# Patient Record
Sex: FEMALE
[De-identification: names, ages, dates, MRNs, and addresses within clinical notes are randomized per-mention and may not be internally consistent; named-entity substitution may affect disease eponyms.]

---

## 2018-05-31 ENCOUNTER — HOSPITAL ENCOUNTER (EMERGENCY)
Dept: HOSPITAL 97 - ER | Age: 23
Discharge: HOME | End: 2018-05-31
Payer: MEDICAID

## 2018-05-31 DIAGNOSIS — Z32.02: Primary | ICD-10-CM

## 2018-05-31 LAB — UA COMPLETE W REFLEX CULTURE PNL UR: (no result)

## 2018-05-31 PROCEDURE — 81025 URINE PREGNANCY TEST: CPT

## 2018-05-31 PROCEDURE — 87088 URINE BACTERIA CULTURE: CPT

## 2018-05-31 PROCEDURE — 81003 URINALYSIS AUTO W/O SCOPE: CPT

## 2018-05-31 PROCEDURE — 81015 MICROSCOPIC EXAM OF URINE: CPT

## 2018-05-31 PROCEDURE — 87086 URINE CULTURE/COLONY COUNT: CPT

## 2018-05-31 PROCEDURE — 36415 COLL VENOUS BLD VENIPUNCTURE: CPT

## 2018-05-31 PROCEDURE — 99283 EMERGENCY DEPT VISIT LOW MDM: CPT

## 2018-05-31 PROCEDURE — 84702 CHORIONIC GONADOTROPIN TEST: CPT

## 2018-05-31 NOTE — ER
Nurse's Notes                                                                                     

 [facility]                                                                

Name: Mendy Ho                                                                              

Age: 23 yrs                                                                                       

Sex: Female                                                                                       

: 1995                                                                                   

MRN: [id number]                                                                                   

Arrival Date: 2018                                                                          

Time: 12:20                                                                                       

Account#: [id number]                                                                            

Bed 16                                                                                            

Private MD: None, None                                                                            

Diagnosis: Pregnancy test confirmation: Negative today                                            

                                                                                                  

Presentation:                                                                                     

                                                                                             

12:37 Presenting complaint: Patient states: Reports possible yeast infection with abdominal   aj  

      cramping. Patient also reports 3 + UPT at home and negative UPT at clinic this AM.          

      Patient would like blood test for pregnancy. "My IUD fell out 2 weeks ago.". Transition     

      of care: patient was not received from another setting of care. Onset of symptoms was       

      May 31, 2018. Care prior to arrival: None.                                                  

12:37 Method Of Arrival: Ambulatory                                                           aj  

12:37 Acuity: JACQUELINE 4                                                                           aj  

12:40 Risk Assessment: Do you want to hurt yourself or someone else? Patient reports no       jl7 

      desire to harm self or others. Initial Sepsis Screen: Does the patient meet any 2           

      criteria? No. Patient's initial sepsis screen is negative. Does the patient have a          

      suspected source of infection? No. Patient's initial sepsis screen is negative.             

                                                                                                  

Triage Assessment:                                                                                

12:39 General: Appears in no apparent distress. comfortable, Behavior is calm, cooperative,   aj  

      appropriate for age. Neuro: Level of Consciousness is awake, alert, obeys commands,         

      Oriented to person, place, time, situation, Appropriate for age. Respiratory: Airway is     

      patent Respiratory effort is even, unlabored, Respiratory pattern is regular,               

      symmetrical. GI:. : Reports cramping, vaginal itching, Denies vaginal bleeding. Derm:     

      Skin is intact, is healthy with good turgor, Skin is pink, warm \T\ dry. normal.            

                                                                                                  

OB/GYN:                                                                                           

12:39 LMP N/A - Irregular menses                                                              aj  

                                                                                                  

Historical:                                                                                       

- Allergies:                                                                                      

12:39 No Known Allergies;                                                                     aj  

- Home Meds:                                                                                      

12:39 None [Active];                                                                          aj  

- PMHx:                                                                                           

12:39 None;                                                                                   aj  

- PSHx:                                                                                           

12:39 ;                                                                              aj  

                                                                                                  

- Immunization history:: Adult Immunizations up to date.                                          

- Social history:: Smoking status: Patient/guardian denies using tobacco.                         

- Ebola Screening: : No symptoms or risks identified at this time.                                

                                                                                                  

                                                                                                  

Screenin:50 Abuse screen: Denies threats or abuse. Denies injuries from another. Nutritional        jl7 

      screening: No deficits noted. Tuberculosis screening: No symptoms or risk factors           

      identified. Fall Risk None identified.                                                      

                                                                                                  

Assessment:                                                                                       

12:50 General: Appears in no apparent distress. uncomfortable, Behavior is calm, cooperative, jl7 

      appropriate for age. Pain: Complains of pain in suprapubic area Pain does not radiate.      

      Pain currently is 7 out of 10 on a pain scale. Pain began 2-3 days ago. Is                  

      intermittent. Neuro: Level of Consciousness is awake, alert, obeys commands.                

      Cardiovascular: Patient's skin is warm and dry. Respiratory: Airway is patent               

      Respiratory effort is even, unlabored, Respiratory pattern is regular, symmetrical. GI:     

      Bowel sounds present X 4 quads. Abd is soft and non tender. : Reports vaginal             

      itching, "For a year but I haven't gotten treated." Denies burning with urination.          

      Derm: Skin is pink, warm \T\ dry.                                                           

14:00 Reassessment: Patient and/or family updated on plan of care and expected duration. Pain jl7 

      level reassessed. Patient is alert, oriented x 3, equal unlabored respirations, skin        

      warm/dry/pink. Pt awaiting lab results.                                                     

15:00 Reassessment: Patient appears in no apparent distress at this time. No changes from     jl7 

      previously documented assessment. Awaiting lab results. Raffi from lab reports results       

      should be done soon. Provider notified.                                                     

15:30 Reassessment: Raffi from lab states "Results are posted." Provider notified.             jl7 

16:06 Reassessment: Patient is alert, oriented x 3, equal unlabored respirations, skin        aa5 

      warm/dry/pink.                                                                              

                                                                                                  

Vital Signs:                                                                                      

12:39  / 77; Pulse 91; Resp 16; Temp 97.7; Pulse Ox 99% on R/A; Weight 72.57 kg; Height aj  

      5 ft. 4 in. (162.56 cm); Pain 6/10;                                                         

14:30  / 75; Pulse 85; Resp 16; Pulse Ox 99% ;                                          jl7 

16:06  / 74; Pulse 88; Resp 14 S; Pulse Ox 99% on R/A;                                  aa5 

12:39 Body Mass Index 27.46 (72.57 kg, 162.56 cm)                                               

                                                                                                  

ED Course:                                                                                        

12:20 Patient arrived in ED.                                                                  mr  

12:20 None, None is Private Physician.                                                        mr  

12:38 Triage completed.                                                                       aj  

12:39 Arm band placed on left wrist. Patient placed in an exam room.                          aj  

12:50 Patient has correct armband on for positive identification. Bed in low position. Call   jl7 

      light in reach. Side rails up X 1.                                                          

12:50 Urine collected: clean catch specimen.                                                  jl7 

12:57 Julisa Edmond RN is Primary Nurse.                                                      jl7 

13:05 Hussein Buckner MD is Attending Physician.                                              kdr 

13:49 HCG-Quantitative Sent.                                                                  jl7 

16:06 Patient did not have IV access during this emergency room visit.                        aa5 

16:06 No provider procedures requiring assistance completed.                                  jl7 

                                                                                                  

Administered Medications:                                                                         

No medications were administered                                                                  

                                                                                                  

                                                                                                  

Outcome:                                                                                          

15:44 Discharge ordered by MD.                                                                kdr 

16:06 Discharged to home ambulatory.                                                          aa5 

16:06 Condition: good                                                                             

16:06 Discharge instructions given to patient, Instructed on discharge instructions, follow       

      up and referral plans. medication usage, Demonstrated understanding of instructions,        

      follow-up care, medications, Prescriptions given X 1.                                       

16:07 Patient left the ED.                                                                    jb1 

                                                                                                  

Signatures:                                                                                       

Missael Cartagena                                 jb1                                                  

Kisha Hernandez, RN                       RN   Hussein Leos MD MD kdr Rivera, Maria                                mr KingstonLaurel almaguer, RN                     RN   Julisa Arriaga, DANIELLA                        RN   jl7                                                  

                                                                                                  

Corrections: (The following items were deleted from the chart)                                    

12:40 12:37 Presenting complaint: Patient states: Reports possible yeast infection with       aj  

      abdominal cramping. Patient also reports 3 + UPT at home and negative UPT at clinic         

      this AM. Patient would like blood test for pregnancy aj                                     

13:12 12:50 : Denies burning with urination, jl7                                            7 

16:48 16:47 Initial Sepsis Screen: Does the patient meet any 2 criteria? No. Patient's        7 

      initial sepsis screen is negative. Does the patient have a suspected source of              

      infection? No. Patient's initial sepsis screen is negative. jl7                             

16:48 16:47 Risk Assessment: Do you want to hurt yourself or someone else? Patient reports no jl7 

      desire to harm self or others. jl7                                                          

                                                                                                  

**************************************************************************************************

## 2018-05-31 NOTE — EDPHYS
Physician Documentation                                                                           

 [facility]                                                                

Name: Mendy Ho                                                                              

Age: 23 yrs                                                                                       

Sex: Female                                                                                       

: 1995                                                                                   

MRN: [id number]                                                                                   

Arrival Date: 2018                                                                          

Time: 12:20                                                                                       

Account#: [id number]                                                                            

Bed 16                                                                                            

Private MD: None, None                                                                            

ED Physician Hussein Buckner                                                                       

HPI:                                                                                              

                                                                                             

21:48 This 23 yrs old  Female presents to ER via Ambulatory with complaints of        kdr 

      Abdominal Pain.                                                                             

21:48 The patient presents to the emergency department with The patient states that she took  kdr 

      three home pregnancy tests that were positive and then went to another clinic where         

      they performed two tests that were negative. She wants to know whether she is pregnant      

      or not.                                                                                     

                                                                                                  

OB/GYN:                                                                                           

12:39 LMP N/A - Irregular menses                                                              aj  

                                                                                                  

Historical:                                                                                       

- Allergies:                                                                                      

12:39 No Known Allergies;                                                                     aj  

- Home Meds:                                                                                      

12:39 None [Active];                                                                          aj  

- PMHx:                                                                                           

12:39 None;                                                                                   aj  

- PSHx:                                                                                           

12:39 ;                                                                              aj  

                                                                                                  

- Immunization history:: Adult Immunizations up to date.                                          

- Social history:: Smoking status: Patient/guardian denies using tobacco.                         

- Ebola Screening: : No symptoms or risks identified at this time.                                

                                                                                                  

                                                                                                  

ROS:                                                                                              

21:48 Constitutional: Negative for fever, chills, and weight loss, Eyes: Negative for injury, kdr 

      pain, redness, and discharge, ENT: Negative for injury, pain, and discharge, Neck:          

      Negative for injury, pain, and swelling, Cardiovascular: Negative for chest pain,           

      palpitations, and edema, Respiratory: Negative for shortness of breath, cough,              

      wheezing, and pleuritic chest pain, Abdomen/GI: Negative for abdominal pain, nausea,        

      vomiting, diarrhea, and constipation, Back: Negative for injury and pain, MS/Extremity:     

      Negative for injury and deformity, Skin: Negative for injury, rash, and discoloration,      

      Neuro: Negative for headache, weakness, numbness, tingling, and seizure activity.           

      Psych: Negative for depression, anxiety, suicide ideation, homicidal ideation, and          

      hallucinations, Allergy/Immunology: Negative for hives, rash, and allergies, Endocrine:     

      Negative for neck swelling, polydipsia, polyuria, polyphagia, and marked weight             

      changes, Hematologic/Lymphatic: Negative for swollen nodes, abnormal bleeding, and          

      unusual bruising.                                                                           

21:48 : Positive for vaginal itching, Intermittent for a year or more - denies discharge.       

                                                                                                  

Exam:                                                                                             

21:48 Constitutional:  This is a well developed, well nourished patient who is awake, alert,  kdr 

      and in no acute distress. Head/Face:  Normocephalic, atraumatic. Eyes:  Pupils equal        

      round and reactive to light, extra-ocular motions intact.  Lids and lashes normal.          

      Conjunctiva and sclera are non-icteric and not injected.  Cornea within normal limits.      

      Periorbital areas with no swelling, redness, or edema. Neck:  Trachea midline, no           

      thyromegaly or masses palpated, and no cervical lymphadenopathy.  Supple, full range of     

      motion without nuchal rigidity, or vertebral point tenderness.  No Meningismus.             

      Chest/axilla:  Normal chest wall appearance and motion.  Nontender with no deformity.       

      No lesions are appreciated. Cardiovascular:  Regular rate and rhythm with a normal S1       

      and S2.  No gallops, murmurs, or rubs.  Normal PMI, no JVD.  No pulse deficits.             

      Respiratory:  Lungs have equal breath sounds bilaterally, clear to auscultation and         

      percussion.  No rales, rhonchi or wheezes noted.  No increased work of breathing, no        

      retractions or nasal flaring. Abdomen/GI:  Soft, non-tender, with normal bowel sounds.      

      No distension or tympany.  No guarding or rebound.  No evidence of tenderness               

      throughout. Back:  No spinal tenderness.  No costovertebral tenderness.  Full range of      

      motion. Skin:  Warm, dry with normal turgor.  Normal color with no rashes, no lesions,      

      and no evidence of cellulitis. MS/ Extremity:  Pulses equal, no cyanosis.                   

      Neurovascular intact.  Full, normal range of motion. Neuro:  Awake and alert, GCS 15,       

      oriented to person, place, time, and situation.  Cranial nerves II-XII grossly intact.      

      Motor strength 5/5 in all extremities.  Sensory grossly intact.  Cerebellar exam            

      normal.  Normal gait. Psych:  Awake, alert, with orientation to person, place and time.     

       Behavior, mood, and affect are within normal limits.                                       

                                                                                                  

Vital Signs:                                                                                      

12:39  / 77; Pulse 91; Resp 16; Temp 97.7; Pulse Ox 99% on R/A; Weight 72.57 kg; Height aj  

      5 ft. 4 in. (162.56 cm); Pain 6/10;                                                         

14:30  / 75; Pulse 85; Resp 16; Pulse Ox 99% ;                                          jl7 

16:06  / 74; Pulse 88; Resp 14 S; Pulse Ox 99% on R/A;                                  aa5 

12:39 Body Mass Index 27.46 (72.57 kg, 162.56 cm)                                             aj  

                                                                                                  

MDM:                                                                                              

13:45 Patient medically screened.                                                             snw 

21:48 Data reviewed: vital signs, lab test result(s). Counseling: I had a detailed discussion kdr 

      with the patient and/or guardian regarding: the historical points, exam findings, and       

      any diagnostic results supporting the discharge/admit diagnosis, lab results, the need      

      for outpatient follow up.                                                                   

                                                                                                  

                                                                                             

13:10 Order name: Urine Dipstick--Ancillary (enter results); Complete Time: 13:41               

                                                                                             

13:10 Order name: Urine Pregnancy--Ancillary (enter results); Complete Time: 13:41              

                                                                                             

13:10 Order name: Urine Microscopic Only; Complete Time: 13:41                                  

                                                                                             

13:10 Order name: Urine Culture                                                                 

                                                                                             

13:31 Order name: HCG-Quantitative                                                            [facility] 

                                                                                             

13:31 Order name: HCG, Quantitative; Complete Time: 15:39                                     EDMS

                                                                                                  

Administered Medications:                                                                         

No medications were administered                                                                  

                                                                                                  

                                                                                                  

Disposition:                                                                                      

18 15:44 Discharged to Home. Impression: Pregnancy test confirmation: Negative today.       

- Condition is Fair.                                                                              

                                                                                                  

- Prescriptions for Fluconazole 150 mg Oral Tablet - take 1 tablet by ORAL route once             

  daily; 1 tablet.                                                                                

- Medication Reconciliation Form, Thank You Letter form.                                          

- Follow up: Private Physician; When: 2 - 3 days; Reason: If symptoms return, Further             

  diagnostic work-up, Recheck today's complaints, Continuance of care, Re-evaluation by           

  your physician.                                                                                 

- Problem is new.                                                                                 

- Symptoms are resolved.                                                                          

                                                                                                  

                                                                                                  

                                                                                                  

Signatures:                                                                                       

Dispatcher [employer]                           EDMS                                                 

Missael Cartagena                                 jb1                                                  

Kisha Hernandez, DANIELLA                       RN   Hussein Leos MD MD kdr Therrien, Shelly, FNP-C                 FNP-Csnw                                                  

Julisa Edmond RN                        RN   jl7                                                  

                                                                                                  

Corrections: (The following items were deleted from the chart)                                    

16:07 15:44 2018 15:44 Discharged to Home. Impression: Pregnancy test confirmation:     jb1 

      Negative today. Condition is Fair. Forms are Medication Reconciliation Form, Thank You      

      Letter, Antibiotic Education, Prescription Opioid Use. Follow up: Private Physician;        

      When: 2 - 3 days; Reason: If symptoms return, Further diagnostic work-up, Recheck           

      today's complaints, Continuance of care, Re-evaluation by your physician. Problem is        

      new. Symptoms are resolved. kdr                                                             

                                                                                                  

**************************************************************************************************

## 2019-02-13 ENCOUNTER — HOSPITAL ENCOUNTER (EMERGENCY)
Dept: HOSPITAL 97 - ER | Age: 24
LOS: 1 days | Discharge: HOME | End: 2019-02-14
Payer: COMMERCIAL

## 2019-02-13 DIAGNOSIS — Z3A.08: ICD-10-CM

## 2019-02-13 DIAGNOSIS — O26.891: Primary | ICD-10-CM

## 2019-02-13 LAB
BUN BLD-MCNC: 8 MG/DL (ref 7–18)
GLUCOSE SERPLBLD-MCNC: 92 MG/DL (ref 74–106)
HCG SERPL-ACNC: (no result) MIU/ML (ref 1–3)
HCT VFR BLD CALC: 40.6 % (ref 36–45)
LYMPHOCYTES # SPEC AUTO: 2.6 K/UL (ref 0.7–4.9)
PMV BLD: 10.1 FL (ref 7.6–11.3)
POTASSIUM SERPL-SCNC: 3.7 MMOL/L (ref 3.5–5.1)
RBC # BLD: 4.72 M/UL (ref 3.86–4.86)

## 2019-02-13 PROCEDURE — 81003 URINALYSIS AUTO W/O SCOPE: CPT

## 2019-02-13 PROCEDURE — 86900 BLOOD TYPING SEROLOGIC ABO: CPT

## 2019-02-13 PROCEDURE — 87590 N.GONORRHOEAE DNA DIR PROB: CPT

## 2019-02-13 PROCEDURE — 87490 CHLMYD TRACH DNA DIR PROBE: CPT

## 2019-02-13 PROCEDURE — 84702 CHORIONIC GONADOTROPIN TEST: CPT

## 2019-02-13 PROCEDURE — 99284 EMERGENCY DEPT VISIT MOD MDM: CPT

## 2019-02-13 PROCEDURE — 80048 BASIC METABOLIC PNL TOTAL CA: CPT

## 2019-02-13 PROCEDURE — 86901 BLOOD TYPING SEROLOGIC RH(D): CPT

## 2019-02-13 PROCEDURE — 81015 MICROSCOPIC EXAM OF URINE: CPT

## 2019-02-13 PROCEDURE — 36415 COLL VENOUS BLD VENIPUNCTURE: CPT

## 2019-02-13 PROCEDURE — 76817 TRANSVAGINAL US OBSTETRIC: CPT

## 2019-02-13 PROCEDURE — 87210 SMEAR WET MOUNT SALINE/INK: CPT

## 2019-02-13 PROCEDURE — 85025 COMPLETE CBC W/AUTO DIFF WBC: CPT

## 2019-02-13 PROCEDURE — 81025 URINE PREGNANCY TEST: CPT

## 2019-02-14 LAB — UA COMPLETE W REFLEX CULTURE PNL UR: (no result)

## 2019-02-14 NOTE — ER
Nurse's Notes                                                                                     

 [facility]                                                                

Name: Mendy Ho                                                                              

Age: 24 yrs                                                                                       

Sex: Female                                                                                       

: 1995                                                                                   

MRN: [id number]                                                                                   

Arrival Date: 2019                                                                          

Time: 20:00                                                                                       

Account#: [id number]                                                                            

Bed 23                                                                                            

Private MD:                                                                                       

Diagnosis: First trimester pregnancy - vaginal discharge                                          

                                                                                                  

Presentation:                                                                                     

                                                                                             

20:24 Presenting complaint: Patient states: "I've been having lower pain and I'm 8 weeks      aj1 

      pregnant. I haven't gone to the doctor and I have green discharge, and I've been            

      getting really bad headaches." Patient denies fever. Transition of care: patient was        

      not received from another setting of care. Onset of symptoms was 2019.         

      Risk Assessment: Do you want to hurt yourself or someone else? Patient reports no           

      desire to harm self or others. Initial Sepsis Screen: Does the patient meet any 2           

      criteria? No. Patient's initial sepsis screen is negative. Does the patient have a          

      suspected source of infection? No. Patient's initial sepsis screen is negative. Care        

      prior to arrival: None.                                                                     

20:24 Method Of Arrival: Ambulatory                                                           aj1 

20:24 Acuity: JACQUELINE 3                                                                           aj1 

                                                                                                  

Triage Assessment:                                                                                

20:26 General: Appears in no apparent distress. comfortable, Behavior is calm, cooperative,   aj1 

      appropriate for age. Pain: Complains of pain in pelvis Pain currently is 6 out of 10 on     

      a pain scale. Neuro: Level of Consciousness is awake, alert, obeys commands.                

      Cardiovascular: Patient's skin is warm and dry. Respiratory: Airway is patent               

      Respiratory effort is even, unlabored, Respiratory pattern is regular, symmetrical. GI:     

      No signs and/or symptoms were reported involving the gastrointestinal system. :           

      Parent/caregiver report the patient having discharge from vagina that is green.             

                                                                                                  

OB/GYN:                                                                                           

20:26 LMP 2018                                                                          aj1 

21:50  2, Full Term 1, Living 1                                                        pm1 

                                                                                                  

Historical:                                                                                       

- Home Meds:                                                                                      

20:26 Prenatal Vitamin Oral tab 1 tab once daily [Active];                                    aj1 

- PMHx:                                                                                           

20:26 None;                                                                                   aj1 

- PSHx:                                                                                           

20:26 ;                                                                              aj1 

                                                                                                  

- Immunization history:: Flu vaccine is not up to date.                                           

- Social history:: Smoking status: Patient/guardian denies using tobacco.                         

- Ebola Screening: : Patient denies travel to an Ebola-affected area in the  days               

  before illness onset.                                                                           

                                                                                                  

                                                                                                  

Screenin/14                                                                                             

01:59 Abuse screen: Denies threats or abuse. Denies injuries from another. Nutritional        mg2 

      screening: No deficits noted. Tuberculosis screening: No symptoms or risk factors           

      identified. Fall Risk IV access (20 points).                                                

                                                                                                  

Assessment:                                                                                       

                                                                                             

20:45 General: Appears in no apparent distress. comfortable, Behavior is calm, cooperative,   ca1 

      appropriate for age. Pain: Complains of pain in suprapubic area and pelvis Pain             

      currently is 7 out of 10 on a pain scale. Neuro: Level of Consciousness is awake,           

      alert, obeys commands, Oriented to person, place, time, situation. Cardiovascular:          

      Heart tones S1 S2 present Capillary refill < 3 seconds Patient's skin is warm and dry.      

      Respiratory: Airway is patent Respiratory effort is even, unlabored, Respiratory            

      pattern is regular, symmetrical. GI: Abdomen is flat, non-distended, Bowel sounds           

      present X 4 quads. Abd is soft X 4 quads Abdomen is tender to palpation in suprapubic       

      area and pelvis. : Reports discharge, green. EENT: No signs and/or symptoms were          

      reported regarding the EENT system. Derm: Skin is intact, is healthy with good turgor,      

      Skin is pink, warm \T\ dry. Musculoskeletal: Circulation, motion, and sensation intact.     

21:50 Reassessment: Patient appears in no apparent distress at this time. Patient and/or      ca1 

      family updated on plan of care and expected duration. Pain level reassessed. Patient is     

      alert, oriented x 3, equal unlabored respirations, skin warm/dry/pink.                      

22:49 Reassessment: Patient appears in no apparent distress at this time. Patient and/or      ca1 

      family updated on plan of care and expected duration. Pain level reassessed. Patient is     

      alert, oriented x 3, equal unlabored respirations, skin warm/dry/pink.                      

                                                                                             

00:00 Reassessment: Patient appears in no apparent distress at this time. Patient and/or      ca1 

      family updated on plan of care and expected duration. Pain level reassessed. Patient is     

      alert, oriented x 3, equal unlabored respirations, skin warm/dry/pink. Provider at          

      bedside for pelvic exam.                                                                    

                                                                                                  

Vital Signs:                                                                                      

                                                                                             

20:26  / 70; Pulse 89; Resp 18; Temp 97.2; Pulse Ox 100% on R/A; Weight 81.19 kg (R);   aj1 

      Height 5 ft. 4 in. (162.56 cm) (R); Pain 6/10;                                              

21:30  / 72; Pulse 91; Resp 18; Pulse Ox 100% on R/A;                                   ca1 

22:35  / 75; Pulse 91; Resp 19; Pulse Ox 100% on R/A;                                   ca1 

23:40  / 71; Pulse 90; Resp 19; Pulse Ox 100% on R/A;                                   ca1 

02                                                                                             

00:34  / 64; Pulse 89; Resp 19; Pulse Ox 100% on R/A;                                   ca1 

02:33  / 62; Pulse 73; Resp 16; Temp 98.9; Pulse Ox 100% ;                              lt1 

                                                                                             

20:26 Body Mass Index 30.72 (81.19 kg, 162.56 cm)                                             aj1 

                                                                                                  

ED Course:                                                                                        

                                                                                             

20:00 Patient arrived in ED.                                                                  ag3 

20:25 Triage completed.                                                                       aj1 

20:26 Arm band placed on Patient placed in waiting room.                                      aj1 

20:40 Emmett Horvath NP is PHCP.                                                           pm1 

20:40 Dick Barnett MD is Attending Physician.                                             pm1 

21:22 Mary Anne Orona, RN is Primary Nurse.                                                      ca1 

22:11 Missed attempt(s): 22 gauge in right antecubital area.                                  lt1 

22:12 Missed attempt(s): 22 gauge in left forearm.                                            lt1 

22:29 US Transvaginal Ob In Process Unspecified.                                              EDMS

22:30 Missed attempt(s): 22 gauge in left hand.                                               ca1 

22:44 Missed attempt(s): 22 gauge in right wrist.                                             ca1 

22:50 Inserted saline lock: 20 gauge in left antecubital area, using aseptic technique.       ca1 

      ,using aseptic technique. By Ger Brown RN Blood collected.                           

                                                                                             

00:00 Assist provider with pelvic exam: Set up pelvic tray. Performed by Emmett Horvath NP   ca1 

      Specimens sent to lab. Patient tolerated well.                                              

00:53 IV discontinued, intact, bleeding controlled, No redness/swelling at site. Pressure     ca1 

      dressing applied.                                                                           

01:59 Patient has correct armband on for positive identification.                             mg2 

                                                                                                  

Administered Medications:                                                                         

No medications were administered                                                                  

                                                                                                  

                                                                                                  

Outcome:                                                                                          

02:21 Discharge ordered by MD.                                                                pm1 

02:43 Discharged to home ambulatory.                                                          rv  

02:43 Condition: good                                                                             

02:43 Discharge instructions given to patient, Instructed on discharge instructions, follow       

      up and referral plans. Demonstrated understanding of instructions, follow-up care.          

02:44 Patient left the ED.                                                                    rv  

                                                                                                  

Signatures:                                                                                       

Dispatcher [employer]                           EDDelphine Pinzon RN                     RN   aj1                                                  

Emmett Horvath, NP                    NP   pm1                                                  

Dipesh Anna RN                    RN   mg2                                                  

Ger Brown RN                    RN   rv                                                   

Kiera Arechiga                                 ag3                                                  

Mary Anne Orona RN                        RN   ca1                                                  

Ashley Haji                                   1                                                  

                                                                                                  

**************************************************************************************************

## 2019-02-14 NOTE — EDPHYS
Physician Documentation                                                                           

 [facility]                                                                

Name: Mendy Ho                                                                              

Age: 24 yrs                                                                                       

Sex: Female                                                                                       

: 1995                                                                                   

MRN: [id number]                                                                                   

Arrival Date: 2019                                                                          

Time: 20:00                                                                                       

Account#: [id number]                                                                            

Bed 23                                                                                            

Private MD:                                                                                       

ED Physician Dick Barnett                                                                      

HPI:                                                                                              

                                                                                             

21:50 This 24 yrs old  Female presents to ER via Ambulatory with complaints of        pm1 

      Vaginal Discharge, Abdominal Pain.                                                          

21:50 The patient presents with vaginal discharge, that is green discharge, patient has had   pm1 

      similar discharge in the past. Onset: The symptoms/episode began/occurred 3 week(s)         

      ago. Modifying factors: The symptoms are alleviated by nothing, the symptoms are            

      aggravated by nothing. Associated signs and symptoms: Pertinent positives: vaginal          

      discharge, lower abdominal pain, Pertinent negatives: dysuria, fever, urinary               

      frequency, vaginal bleeding. Severity of symptoms: in the emergency department the          

      symptoms are unchanged. The patient is sexually active, reportedly has a single             

      partner, does not use protection during intercourse, continuing to have sexual              

      intercourse. intercourse is not painful. The patient's method of birth control includes     

      nothing. The patient has been recently seen by a physician: Seen at another ER for the      

      same complaints 3 weeks ago.                                                                

                                                                                                  

OB/GYN:                                                                                           

20:26 LMP 2018                                                                          aj1 

21:50  2, Full Term 1, Living 1                                                        pm1 

                                                                                                  

Historical:                                                                                       

- Home Meds:                                                                                      

20:26 Prenatal Vitamin Oral tab 1 tab once daily [Active];                                    aj1 

- PMHx:                                                                                           

20:26 None;                                                                                   aj1 

- PSHx:                                                                                           

20:26 ;                                                                              aj1 

                                                                                                  

- Immunization history:: Flu vaccine is not up to date.                                           

- Social history:: Smoking status: Patient/guardian denies using tobacco.                         

- Ebola Screening: : Patient denies travel to an Ebola-affected area in the 21 days               

  before illness onset.                                                                           

                                                                                                  

                                                                                                  

ROS:                                                                                              

21:50  Positive for vaginal discharge, Negative for urinary symptoms, burning with          pm1 

      urination, vaginal bleeding.                                                                

21:50 Constitutional: Negative for fever, chills, and weight loss, Eyes: Negative for injury,     

      pain, redness, and discharge, ENT: Negative for injury, pain, and discharge, Neck:          

      Negative for injury, pain, and swelling, Cardiovascular: Negative for chest pain,           

      palpitations, and edema, Respiratory: Negative for shortness of breath, cough,              

      wheezing, and pleuritic chest pain.                                                         

21:50 Back: Negative for injury and pain, MS/Extremity: Negative for injury and deformity,        

      Skin: Negative for injury, rash, and discoloration, Neuro: Negative for headache,           

      weakness, numbness, tingling, and seizure.                                                  

21:50 Abdomen/GI: Positive for abdominal pain, of the suprapubic area.                            

                                                                                                  

Exam:                                                                                             

21:50 Constitutional:  This is a well developed, well nourished patient who is awake, alert,  pm1 

      and in no acute distress. Head/Face:  Normocephalic, atraumatic. Eyes:  Pupils equal        

      round and reactive to light, extra-ocular motions intact.  Lids and lashes normal.          

      Conjunctiva and sclera are non-icteric and not injected.  Cornea within normal limits.      

      Periorbital areas with no swelling, redness, or edema. ENT:  Nares patent. No nasal         

      discharge, no septal abnormalities noted.  Tympanic membranes are normal and external       

      auditory canals are clear.  Oropharynx with no redness, swelling, or masses, exudates,      

      or evidence of obstruction, uvula midline.  Mucous membranes moist. Neck:  Trachea          

      midline, no thyromegaly or masses palpated, and no cervical lymphadenopathy.  Supple,       

      full range of motion without nuchal rigidity, or vertebral point tenderness.  No            

      Meningismus. Chest/axilla:  Normal chest wall appearance and motion.  Nontender with no     

      deformity.  No lesions are appreciated. Cardiovascular:  Regular rate and rhythm with a     

      normal S1 and S2.  No gallops, murmurs, or rubs.  Normal PMI, no JVD.  No pulse             

      deficits. Respiratory:  Lungs have equal breath sounds bilaterally, clear to                

      auscultation and percussion.  No rales, rhonchi or wheezes noted.  No increased work of     

      breathing, no retractions or nasal flaring. Abdomen/GI:  Soft, non-tender, with normal      

      bowel sounds.  No distension or tympany.  No guarding or rebound.  No evidence of           

      tenderness throughout. Back:  No spinal tenderness.  No costovertebral tenderness.          

      Full range of motion. Skin:  Warm, dry with normal turgor.  Normal color with no            

      rashes, no lesions, and no evidence of cellulitis. MS/ Extremity:  Pulses equal, no         

      cyanosis.  Neurovascular intact.  Full, normal range of motion.                             

21:50 Neuro: Orientation: is normal, Motor: is normal, Sensation: is normal, no obvious gross     

      deficits, Gait: is steady, at a normal pace, without difficulty.                            

                                                                                                  

Vital Signs:                                                                                      

20:26  / 70; Pulse 89; Resp 18; Temp 97.2; Pulse Ox 100% on R/A; Weight 81.19 kg (R);   aj1 

      Height 5 ft. 4 in. (162.56 cm) (R); Pain 6/10;                                              

21:30  / 72; Pulse 91; Resp 18; Pulse Ox 100% on R/A;                                   ca1 

22:35  / 75; Pulse 91; Resp 19; Pulse Ox 100% on R/A;                                   ca1 

23:40  / 71; Pulse 90; Resp 19; Pulse Ox 100% on R/A;                                   ca1 

                                                                                             

00:34  / 64; Pulse 89; Resp 19; Pulse Ox 100% on R/A;                                   ca1 

02:33  / 62; Pulse 73; Resp 16; Temp 98.9; Pulse Ox 100% ;                              lt1 

                                                                                             

20:26 Body Mass Index 30.72 (81.19 kg, 162.56 cm)                                             aj1 

                                                                                                  

MDM:                                                                                              

                                                                                             

20:53 Patient medically screened.                                                             pm1 

21:53 Data reviewed: vital signs. Data interpreted: Pulse oximetry: on room air is 100 %.     pm1 

      Interpretation: normal.                                                                     

                                                                                             

02:15 Counseling: I had a detailed discussion with the patient and/or guardian regarding: the pm1 

      historical points, exam findings, and any diagnostic results supporting the                 

      discharge/admit diagnosis, lab results, radiology results, the need for outpatient          

      follow up, to return to the emergency department if symptoms worsen or persist or if        

      there are any questions or concerns that arise at home.                                     

02:21 ED course: Patient tested 3 weeks ago for gonorrhea and chlamydia at ER. Patient        pm1 

      reports results were negative. Patient with one sexual partner. Will wait for GC probe      

      results. Impression is normal vaginal discharge with first trimester pregnancy.             

                                                                                                  

                                                                                             

20:55 Order name: Urine Microscopic Only; Complete Time: 01:21                                pm1 

                                                                                             

20:56 Order name: Quantitative Hcg; Complete Time: 23:48                                      pm1 

                                                                                             

20:56 Order name: Abo/rh Typing; Complete Time: 00:03                                         pm1 

                                                                                             

20:56 Order name: Basic Metabolic Panel; Complete Time: 23:48                                 pm1 

                                                                                             

20:56 Order name: CBC with Diff; Complete Time: 23:48                                         pm1 

                                                                                             

20:56 Order name: GC (GONORR/CHLAMYDIA) Probe                                                 pm1 

                                                                                             

20:55 Order name: Urine Dipstick-Ancillary (obtain specimen); Complete Time: 23:16            pm1 

                                                                                             

20:55 Order name: Urine Pregnancy Test (obtain specimen); Complete Time: 23:16                pm1 

                                                                                             

20:56 Order name: IV Saline Lock; Complete Time: 22:58                                        pm1 

                                                                                             

20:56 Order name: Labs collected and sent; Complete Time: 22:58                               pm1 

                                                                                             

21:13 Order name: US Transvaginal Ob                                                          pm1 

                                                                                             

23:07 Order name: Urine Dipstick--Ancillary (enter results); Complete Time: 23:48             ar5 

                                                                                             

23:07 Order name: Urine Pregnancy--Ancillary (enter results); Complete Time: 23:48            ar5 

                                                                                             

23:27 Order name: Wet Prep; Complete Time: 02:14                                              pm1 

                                                                                             

20:56 Order name: NPO; Complete Time: 22:58                                                   pm1 

                                                                                             

20:56 Order name: Pelvic Exam Setup; Complete Time: 00:20                                     pm1 

                                                                                                  

Administered Medications:                                                                         

No medications were administered                                                                  

                                                                                                  

                                                                                                  

Disposition:                                                                                      

07:27 Co-signature as Attending Physician, Dick Barnett MD I agree with the assessment and  keyana 

      plan of care.                                                                               

                                                                                                  

Disposition:                                                                                      

19 02:21 Discharged to Home. Impression: First trimester pregnancy - vaginal discharge.     

- Condition is Stable.                                                                            

- Discharge Instructions: First Trimester of Pregnancy.                                           

                                                                                                  

- Medication Reconciliation Form, Thank You Letter, Antibiotic Education, Prescription            

  Opioid Use, Family Work Release form.                                                           

- Follow up: Emergency Department; When: As needed; Reason: Worsening of condition.               

  Follow up: Private Physician; When: 2 - 3 days; Reason: Recheck today's complaints,             

  Continuance of care, Re-evaluation by your physician.                                           

- Problem is new.                                                                                 

- Symptoms have improved.                                                                         

                                                                                                  

                                                                                                  

                                                                                                  

Signatures:                                                                                       

Dispatcher [employer]                           Delphine Traylor, RN                     RN   aj1                                                  

Dick Barnett MD MD cha Marinas, Patrick, NP                    NP   pm1                                                  

Ger Brown RN                    RN   rv                                                   

                                                                                                  

Corrections: (The following items were deleted from the chart)                                    

02:44 02:21 2019 02:21 Discharged to Home. Impression: First trimester pregnancy -      rv  

      vaginal discharge. Condition is Stable. Forms are Medication Reconciliation Form, Thank     

      You Letter, Antibiotic Education, Prescription Opioid Use. Follow up: Emergency             

      Department; When: As needed; Reason: Worsening of condition. Follow up: Private             

      Physician; When: 2 - 3 days; Reason: Recheck today's complaints, Continuance of care,       

      Re-evaluation by your physician. Problem is new. Symptoms have improved. pm1                

                                                                                                  

**************************************************************************************************

## 2019-02-14 NOTE — RAD REPORT
EXAM DESCRIPTION:  US - Transvaginal OB - 2/13/2019 10:28 pm

 

CLINICAL HISTORY:  Pregnancy, abdominal cramping, vaginal discharge

 

Preliminary findings provided at the time of the study.

 

COMPARISON:  Ultrasound December 2016

 

TECHNIQUE:  Endovaginal sonography performed.

 

FINDINGS:  The normal shaped intrauterine gestational sac is identified. Yolk sac and fetal pole iden
tified. Heart rate is 158 BPM. A 10 millimeter subchorionic hemorrhage is present at the inferior mar
gin of the gestational sac not regarded as significant at that small size. Crown-rump length correspo
nds to 8 week 0 day age. Calculated LINK is 09/25/2019. Cervical canal is closed.

 

No ovarian or adnexal abnormality seen. No blood or fluid in the cul de sac. No myometrial mass.

 

IMPRESSION:  Single 8 week 0 day IUP identified with heart rate 158 BPM.

 

Small 10 mm subchorionic hemorrhage along the inferior margin common not regarded as significant at t
hat size.

## 2019-03-10 ENCOUNTER — HOSPITAL ENCOUNTER (EMERGENCY)
Dept: HOSPITAL 97 - ER | Age: 24
LOS: 1 days | Discharge: HOME | End: 2019-03-11
Payer: COMMERCIAL

## 2019-03-10 DIAGNOSIS — Z3A.11: ICD-10-CM

## 2019-03-10 DIAGNOSIS — O26.891: Primary | ICD-10-CM

## 2019-03-10 DIAGNOSIS — B34.9: ICD-10-CM

## 2019-03-10 DIAGNOSIS — R42: ICD-10-CM

## 2019-03-10 DIAGNOSIS — R05: ICD-10-CM

## 2019-03-10 DIAGNOSIS — R07.9: ICD-10-CM

## 2019-03-10 LAB
ALBUMIN SERPL BCP-MCNC: 3.7 G/DL (ref 3.4–5)
ALP SERPL-CCNC: 70 U/L (ref 45–117)
ALT SERPL W P-5'-P-CCNC: 16 U/L (ref 12–78)
AST SERPL W P-5'-P-CCNC: 13 U/L (ref 15–37)
BLD SMEAR INTERP: (no result)
BUN BLD-MCNC: 6 MG/DL (ref 7–18)
GLUCOSE SERPLBLD-MCNC: 94 MG/DL (ref 74–106)
HCT VFR BLD CALC: 39 % (ref 36–45)
INR BLD: 1.05
LYMPHOCYTES # SPEC AUTO: 0.5 K/UL (ref 0.7–4.9)
MAGNESIUM SERPL-MCNC: 1.9 MG/DL (ref 1.8–2.4)
MORPHOLOGY BLD-IMP: (no result)
NT-PROBNP SERPL-MCNC: 19 PG/ML (ref ?–125)
PMV BLD: 10.2 FL (ref 7.6–11.3)
POTASSIUM SERPL-SCNC: 3.3 MMOL/L (ref 3.5–5.1)
RBC # BLD: 4.56 M/UL (ref 3.86–4.86)
TROPONIN (EMERG DEPT USE ONLY): < 0.02 NG/ML (ref 0–0.04)
UA COMPLETE W REFLEX CULTURE PNL UR: (no result)

## 2019-03-10 PROCEDURE — 96360 HYDRATION IV INFUSION INIT: CPT

## 2019-03-10 PROCEDURE — 83880 ASSAY OF NATRIURETIC PEPTIDE: CPT

## 2019-03-10 PROCEDURE — 84484 ASSAY OF TROPONIN QUANT: CPT

## 2019-03-10 PROCEDURE — 93005 ELECTROCARDIOGRAM TRACING: CPT

## 2019-03-10 PROCEDURE — 85610 PROTHROMBIN TIME: CPT

## 2019-03-10 PROCEDURE — 85025 COMPLETE CBC W/AUTO DIFF WBC: CPT

## 2019-03-10 PROCEDURE — 87081 CULTURE SCREEN ONLY: CPT

## 2019-03-10 PROCEDURE — 36415 COLL VENOUS BLD VENIPUNCTURE: CPT

## 2019-03-10 PROCEDURE — 80048 BASIC METABOLIC PNL TOTAL CA: CPT

## 2019-03-10 PROCEDURE — 81003 URINALYSIS AUTO W/O SCOPE: CPT

## 2019-03-10 PROCEDURE — 81015 MICROSCOPIC EXAM OF URINE: CPT

## 2019-03-10 PROCEDURE — 87070 CULTURE OTHR SPECIMN AEROBIC: CPT

## 2019-03-10 PROCEDURE — 81025 URINE PREGNANCY TEST: CPT

## 2019-03-10 PROCEDURE — 87804 INFLUENZA ASSAY W/OPTIC: CPT

## 2019-03-10 PROCEDURE — 83735 ASSAY OF MAGNESIUM: CPT

## 2019-03-10 PROCEDURE — 80076 HEPATIC FUNCTION PANEL: CPT

## 2019-03-10 PROCEDURE — 99284 EMERGENCY DEPT VISIT MOD MDM: CPT

## 2019-03-10 NOTE — XMS REPORT
Patient Summary Document

 Created on:March 10, 2019



Patient:YULIANA ASHRAF

Sex:Female

:1995

External Reference #:[id number]





Demographics







 Address  [address] [place] [address], [place] [address]

 

 Home Phone  [phone]

 

 Preferred Language  Unknown

 

 Marital Status  Unknown

 

 Anabaptist Affiliation  Unknown

 

 Race  Unknown

 

 Additional Race(s)  Unavailable

 

 Ethnic Group  Unknown









Author







 Organization  [facility]

 

 Address  [facility]



   [place] [address]

 

 Phone  [phone]









Care Team Providers







 Name  Role  Phone

 

 Unavailable  Unavailable  Unavailable









Problems

This patient has no known problems.



Allergies, Adverse Reactions, Alerts

This patient has no known allergies or adverse reactions.



Medications

This patient has no known medications.

## 2019-03-11 NOTE — ER
Nurse's Notes                                                                                     

 [facility]                                                                

Name: Mendy Ho                                                                              

Age: 24 yrs                                                                                       

Sex: Female                                                                                       

: 1995                                                                                   

MRN: [id number]                                                                                   

Arrival Date: 03/10/2019                                                                          

Time: 21:27                                                                                       

Account#: [id number]                                                                            

Bed 26                                                                                            

Private MD:                                                                                       

Diagnosis: Viral infection, unspecified                                                           

                                                                                                  

Presentation:                                                                                     

03/10                                                                                             

21:48 Presenting complaint: Patient states: SINCE LAST NIGHT PT C/O COUGH, ITCHING THROAT,    ak1 

      DIZZY, CHEST PAIN. PT OB/GYN [facility]. Transition of care: patient was not received from        

      another setting of care. Onset of symptoms was 2019. Risk Assessment: Do you      

      want to hurt yourself or someone else? Patient reports no desire to harm self or            

      others. Care prior to arrival: None.                                                        

21:48 Method Of Arrival: Ambulatory                                                           ak1 

21:48 Acuity: JACQUELINE 3                                                                           ak1 

22:20 Initial Sepsis Screen: Does the patient meet any 2 criteria? No. Patient's initial      rv  

      sepsis screen is negative. Does the patient have a suspected source of infection? No.       

      Patient's initial sepsis screen is negative.                                                

                                                                                                  

Triage Assessment:                                                                                

21:49 General: Appears in no apparent distress. Behavior is calm, cooperative.                ak1 

                                                                                                  

OB/GYN:                                                                                           

21:49 LMP 2018, Pregnancy Verified, EDC 2019, Gestational age from LMP: 11 weeks 2 ak1 

      days                                                                                        

                                                                                                  

Historical:                                                                                       

- Allergies:                                                                                      

21:49 No Known Allergies;                                                                     ak1 

- Home Meds:                                                                                      

21:49 Prenatal Vitamin Oral tab 1 tab once daily [Active];                                    ak1 

- PMHx:                                                                                           

21:49 None;                                                                                   ak1 

- PSHx:                                                                                           

21:49 ;                                                                              ak1 

                                                                                                  

- Immunization history:: Adult Immunizations unknown.                                             

- Social history:: Smoking status: Patient/guardian denies using tobacco.                         

- Ebola Screening: : No symptoms or risks identified at this time.                                

                                                                                                  

                                                                                                  

Screenin:20 Abuse screen: Denies threats or abuse. Denies injuries from another. Nutritional        rv  

      screening: No deficits noted. Tuberculosis screening: No symptoms or risk factors           

      identified. Fall Risk None identified.                                                      

                                                                                                  

Assessment:                                                                                       

22:18 General: Appears in no apparent distress. comfortable, Behavior is calm, cooperative.   rv  

      Pain: Complains of pain in chest. Neuro: Level of Consciousness is awake, alert, obeys      

      commands, Oriented to person, place, time, situation. Cardiovascular: Capillary refill      

      < 3 seconds. Respiratory: Airway is patent. GI: Abdomen is flat. : No signs and/or        

      symptoms were reported regarding the genitourinary system. EENT: No signs and/or            

      symptoms were reported regarding the EENT system. Derm: Skin is intact.                     

      Musculoskeletal: No signs and/or symptoms reported regarding the musculoskeletal system.    

                                                                                             

00:25 Reassessment: Patient states feeling better.                                            mg2 

                                                                                                  

Vital Signs:                                                                                      

03/10                                                                                             

21:49  / 64; Pulse 129; Resp 16; Temp 98.5; Pulse Ox 100% on R/A; Weight 83.91 kg (R);  ak1 

      Height 5 ft. 4 in. (162.56 cm) (R); Pain 10/10;                                             

22:17 Temp 101.8(O);                                                                          mg2 

23:05  / 67; Pulse 112; Resp 17; Temp 99.7(O); Pulse Ox 100% 0 lpm ; Pain 2/10;         mg2 

21:49 Body Mass Index 31.75 (83.91 kg, 162.56 cm)                                             ak1 

                                                                                                  

ED Course:                                                                                        

21:27 Patient arrived in ED.                                                                  es  

21:49 Triage completed.                                                                       ak1 

21:49 Arm band placed on Patient placed in waiting room, Patient notified of wait time.       ak1 

21:54 Nasir Gonzalez PA is PHCP.                                                               [id number] 

21:54 Go Anderson MD is Attending Physician.                                            [id number] 

22:10 Inserted saline lock: 18 gauge in right antecubital area, using aseptic technique.      rv  

      Blood collected.                                                                            

22:18 Strep Sent.                                                                             rv  

22:18 Flu Sent.                                                                               rv  

22:20 Patient has correct armband on for positive identification. Placed in gown. Bed in low  rv  

      position. Call light in reach. Side rails up X 1. Adult w/ patient. Cardiac monitor on.     

      Pulse ox on. NIBP on.                                                                       

                                                                                             

00:30 No provider procedures requiring assistance completed. IV discontinued, intact,         mg2 

      bleeding controlled, No redness/swelling at site. Pressure dressing applied.                

                                                                                                  

Administered Medications:                                                                         

03/10                                                                                             

22:21 Drug: Tylenol 1000 mg Route: PO;                                                        mg2 

                                                                                             

00:22 Follow up: Response: No adverse reaction; Marked relief of symptoms                     mg2 

03/10                                                                                             

22:58 Drug: NS 0.9% 1000 ml Route: IV; Rate: 1000 ml; Site: right antecubital;                mg2 

                                                                                             

00:22 Follow up: Response: No adverse reaction; IV Status: Completed infusion                 mg2 

                                                                                                  

                                                                                                  

Outcome:                                                                                          

00:13 Discharge ordered by MD. powell 

00:30 Discharged to home ambulatory.                                                          mg2 

00:30 Condition: stable                                                                           

00:30 Discharge instructions given to patient, family, Instructed on discharge instructions,      

      follow up and referral plans. medication usage, Demonstrated understanding of               

      instructions, follow-up care, medications, Prescriptions given X 1.                         

00:32 Patient left the ED.                                                                    mg2 

                                                                                                  

Signatures:                                                                                       

Marisol Vickers Josh, PA PA [id number]                                                  

Kaylen Mcneal RN                       RN   ak1                                                  

Dipesh Anna RN                    RN   mg2                                                  

Ger Brown RN                    RN   rv                                                   

                                                                                                  

**************************************************************************************************

## 2019-03-11 NOTE — EDPHYS
Physician Documentation                                                                           

 [facility]                                                                

Name: Mendy Ho                                                                              

Age: 24 yrs                                                                                       

Sex: Female                                                                                       

: 1995                                                                                   

MRN: [id number]                                                                                   

Arrival Date: 03/10/2019                                                                          

Time: 21:27                                                                                       

Account#: [id number]                                                                            

Bed 26                                                                                            

Private MD:                                                                                       

ED Physician Go Anderson                                                                     

HPI:                                                                                              

03/10                                                                                             

22:55 This 24 yrs old  Female presents to ER via Ambulatory with complaints of Chest  jr8 

      Pain, Dizziness, Cough.                                                                     

22:55 The patient reports fever, with an emergency department temperature of 101.8 degrees    jr8 

      Fahrenheit. Onset: The symptoms/episode began/occurred acutely, yesterday. Modifying        

      factors: there are no obvious modifying factors. Associated signs and symptoms:             

      Pertinent positives: chills, cough, sore throat. Severity of symptoms: At their worst       

      the symptoms were moderate. The patient has not experienced similar symptoms in the         

      past. The patient has not recently seen a physician.                                        

                                                                                                  

OB/GYN:                                                                                           

21:49 LMP 2018, Pregnancy Verified, EDC 2019, Gestational age from LMP: 11 weeks 2 ak1 

      days                                                                                        

                                                                                                  

Historical:                                                                                       

- Allergies:                                                                                      

21:49 No Known Allergies;                                                                     ak1 

- Home Meds:                                                                                      

21:49 Prenatal Vitamin Oral tab 1 tab once daily [Active];                                    ak1 

- PMHx:                                                                                           

21:49 None;                                                                                   ak1 

- PSHx:                                                                                           

21:49 ;                                                                              ak1 

                                                                                                  

- Immunization history:: Adult Immunizations unknown.                                             

- Social history:: Smoking status: Patient/guardian denies using tobacco.                         

- Ebola Screening: : No symptoms or risks identified at this time.                                

                                                                                                  

                                                                                                  

ROS:                                                                                              

22:56 Neck: Negative for injury, pain, and swelling, Cardiovascular: Negative for chest pain, jr8 

      palpitations, and edema, Abdomen/GI: Negative for abdominal pain, nausea, vomiting,         

      diarrhea, and constipation, Back: Negative for injury and pain, MS/Extremity: Negative      

      for injury and deformity, Skin: Negative for injury, rash, and discoloration.               

22:56 Constitutional: Positive for body aches, chills, fever.                                     

22:56 ENT: Positive for sore throat.                                                              

22:56 Respiratory: Positive for cough, Negative for dyspnea on exertion, shortness of breath,     

      sputum production, wheezing.                                                                

22:56 Neuro: Positive for headache, Negative for altered mental status, dizziness, gait           

      disturbance, hearing loss, loss of consciousness, numbness, seizure activity, speech        

      changes, syncope, near syncope, tingling, tinnitus, tremor, visual changes, weakness.       

                                                                                                  

Exam:                                                                                             

22:56 Eyes:  Pupils equal round and reactive to light, extra-ocular motions intact.  Lids and jr8 

      lashes normal.  Conjunctiva and sclera are non-icteric and not injected.  Cornea within     

      normal limits.  Periorbital areas with no swelling, redness, or edema. ENT:  Nares          

      patent. No nasal discharge, no septal abnormalities noted.  Tympanic membranes are          

      normal and external auditory canals are clear.  Oropharynx with no redness, swelling,       

      or masses, exudates, or evidence of obstruction, uvula midline.  Mucous membranes           

      moist. Neck:  Trachea midline, no thyromegaly or masses palpated, and no cervical           

      lymphadenopathy.  Supple, full range of motion without nuchal rigidity, or vertebral        

      point tenderness.  No Meningismus. Cardiovascular:  Regular rate and rhythm with a          

      normal S1 and S2.  No gallops, murmurs, or rubs.  Normal PMI, no JVD.  No pulse             

      deficits. Respiratory:  Lungs have equal breath sounds bilaterally, clear to                

      auscultation and percussion.  No rales, rhonchi or wheezes noted.  No increased work of     

      breathing, no retractions or nasal flaring. Abdomen/GI:  Soft, non-tender, with normal      

      bowel sounds.  No distension or tympany.  No guarding or rebound.  No evidence of           

      tenderness throughout. Back:  No spinal tenderness.  No costovertebral tenderness.          

      Full range of motion. Skin:  Warm, dry with normal turgor.  Normal color with no            

      rashes, no lesions, and no evidence of cellulitis. MS/ Extremity:  Pulses equal, no         

      cyanosis.  Neurovascular intact.  Full, normal range of motion. Neuro:  Awake and           

      alert, GCS 15, oriented to person, place, time, and situation.  Cranial nerves II-XII       

      grossly intact.  Motor strength 5/5 in all extremities.  Sensory grossly intact.            

      Cerebellar exam normal.  Normal gait.                                                       

                                                                                                  

Vital Signs:                                                                                      

21:49  / 64; Pulse 129; Resp 16; Temp 98.5; Pulse Ox 100% on R/A; Weight 83.91 kg (R);  ak1 

      Height 5 ft. 4 in. (162.56 cm) (R); Pain 10/10;                                             

22:17 Temp 101.8(O);                                                                          mg2 

23:05  / 67; Pulse 112; Resp 17; Temp 99.7(O); Pulse Ox 100% 0 lpm ; Pain 2/10;         mg2 

21:49 Body Mass Index 31.75 (83.91 kg, 162.56 cm)                                             ak1 

                                                                                                  

MDM:                                                                                              

22:24 Patient medically screened.                                                             jr8 

                                                                                             

00:12 Data reviewed: vital signs, nurses notes, lab test result(s), EKG. Data interpreted:    jr8 

      Pulse oximetry: on room air is 100 %. Interpretation: normal. Counseling: I had a           

      detailed discussion with the patient and/or guardian regarding: the historical points,      

      exam findings, and any diagnostic results supporting the discharge/admit diagnosis, lab     

      results, the need for outpatient follow up, a family practitioner, to return to the         

      emergency department if symptoms worsen or persist or if there are any questions or         

      concerns that arise at home. Response to treatment: the patient's symptoms have             

      markedly improved after treatment, patient is well hydrated.                                

                                                                                                  

03/10                                                                                             

22:05 Order name: Flu; Complete Time: 22:57                                                   mg2 

03/10                                                                                             

22:05 Order name: Strep; Complete Time: 22:57                                                 mg2 

03/10                                                                                             

22:07 Order name: Basic Metabolic Panel; Complete Time: 22:44                                 mg2 

03/10                                                                                             

22:07 Order name: CBC with Diff; Complete Time: 23:35                                         mg2 

03/10                                                                                             

22:07 Order name: LFT's; Complete Time: 22:44                                                 mg2 

03/10                                                                                             

22:07 Order name: Magnesium; Complete Time: 22:44                                             mg2 

03/10                                                                                             

22:07 Order name: NT PRO-BNP; Complete Time: 22:44                                            mg2 

03/10                                                                                             

22:07 Order name: PT-INR; Complete Time: 22:57                                                mg2 

03/10                                                                                             

22:07 Order name: Troponin (emerg Dept Use Only); Complete Time: 22:44                        mg2 

03/10                                                                                             

22:29 Order name: CBC Smear Scan; Complete Time: 23:35                                        EDMS

03/10                                                                                             

22:47 Order name: Throat Culture                                                              EDMS

03/10                                                                                             

22:49 Order name: Urine Microscopic Only; Complete Time: 23:35                                eb  

03/10                                                                                             

23:16 Order name: Urine Dipstick--Ancillary (enter results); Complete Time: 23:35             eb  

03/10                                                                                             

23:21 Order name: Urine Pregnancy--Ancillary (enter results); Complete Time: 23:35            eb  

03/10                                                                                             

22:07 Order name: EKG; Complete Time: 22:08                                                   mg2 

03/10                                                                                             

22:07 Order name: Cardiac monitoring; Complete Time: 22:17                                    mg2 

03/10                                                                                             

22:07 Order name: EKG - Nurse/Tech; Complete Time: 22:17                                      mg2 

03/10                                                                                             

22:07 Order name: IV Saline Lock; Complete Time: 22:17                                        mg2 

03/10                                                                                             

22:07 Order name: Labs collected and sent; Complete Time: 22:                               mg2 

03/10                                                                                             

22:07 Order name: O2 Per Protocol; Complete Time: :                                       mg2 

03/10                                                                                             

22:07 Order name: O2 Sat Monitoring; Complete Time: 22:18                                     mg2 

                                                                                                  

Administered Medications:                                                                         

03/10                                                                                             

22:21 Drug: Tylenol 1000 mg Route: PO;                                                        mg2 

                                                                                             

00:22 Follow up: Response: No adverse reaction; Marked relief of symptoms                     mg2 

03/10                                                                                             

22:58 Drug: NS 0.9% 1000 ml Route: IV; Rate: 1000 ml; Site: right antecubital;                mg2 

                                                                                             

00:22 Follow up: Response: No adverse reaction; IV Status: Completed infusion                 mg2 

                                                                                                  

                                                                                                  

Disposition:                                                                                      

06:10 Co-signature as Attending Physician, Go Anderson MD I agree with the assessment and tw4 

      plan of care.                                                                               

                                                                                                  

Disposition:                                                                                      

19 00:13 Discharged to Home. Impression: Viral infection, unspecified.                      

- Condition is Stable.                                                                            

- Discharge Instructions: Viral Respiratory Infection.                                            

- Prescriptions for Tamiflu 75 mg Oral Capsule - take 1 capsule by ORAL route every 12            

  hours for 5 days; 10 capsule.                                                                   

- Medication Reconciliation Form, Thank You Letter, Antibiotic Education, Prescription            

  Opioid Use form.                                                                                

- Follow up: Private Physician; When: 2 - 3 days; Reason: Recheck today's complaints,             

  Continuance of care, Re-evaluation by your physician.                                           

- Problem is new.                                                                                 

- Symptoms have improved.                                                                         

                                                                                                  

                                                                                                  

                                                                                                  

Signatures:                                                                                       

Dispatcher [employer]                           EDMS                                                 

Nasir Gonzalez PA PA   jr8                                                  

Kaylen Mcneal RN                       RN   ak1                                                  

Go Anderson MD MD   tw4                                                  

Dipesh Anna RN RN   mg2                                                  

Ger Brown RN                    RN   rv                                                   

                                                                                                  

Corrections: (The following items were deleted from the chart)                                    

03/10                                                                                             

23:13 22:58 Chest Single View+RAD.RAD.BRZ ordered. [facility]                                       EDMS

                                                                                             

00:32 00:13 2019 00:13 Discharged to Home. Impression: Viral infection, unspecified.    mg2 

      Condition is Stable. Forms are Medication Reconciliation Form, Thank You Letter,            

      Antibiotic Education, Prescription Opioid Use. Follow up: Private Physician; When: 2 -      

      3 days; Reason: Recheck today's complaints, Continuance of care, Re-evaluation by your      

      physician. Problem is new. Symptoms have improved. jr8                                      

                                                                                                  

**************************************************************************************************

## 2019-03-11 NOTE — EKG
Test Date:    2019-03-10               Test Time:    20:58:23

Technician:   SARITHAT                                    

                                                     

MEASUREMENT RESULTS:                                       

Intervals:                                           

Rate:         126                                    

NY:           152                                    

QRSD:         86                                     

QT:           322                                    

QTc:          466                                    

Axis:                                                

P:            46                                     

NY:           152                                    

QRS:          38                                     

T:            32                                     

                                                     

INTERPRETIVE STATEMENTS:                                       

                                                     

Sinus tachycardia

Otherwise normal ECG

No previous ECG available for comparison



Electronically Signed On 03-11-19 08:33:32 CDT by Doroteo Zavaleta

## 2019-09-20 ENCOUNTER — HOSPITAL ENCOUNTER (EMERGENCY)
Dept: HOSPITAL 97 - ER | Age: 24
Discharge: HOME | End: 2019-09-20
Payer: COMMERCIAL

## 2019-09-20 VITALS — SYSTOLIC BLOOD PRESSURE: 104 MMHG | DIASTOLIC BLOOD PRESSURE: 68 MMHG

## 2019-09-20 VITALS — OXYGEN SATURATION: 97 % | TEMPERATURE: 98.9 F

## 2019-09-20 DIAGNOSIS — D64.9: ICD-10-CM

## 2019-09-20 DIAGNOSIS — Z88.8: ICD-10-CM

## 2019-09-20 LAB
BUN BLD-MCNC: 10 MG/DL (ref 7–18)
GLUCOSE SERPLBLD-MCNC: 80 MG/DL (ref 74–106)
HCT VFR BLD CALC: 28.8 % (ref 36–45)
LYMPHOCYTES # SPEC AUTO: 1.6 K/UL (ref 0.7–4.9)
PMV BLD: 8.4 FL (ref 7.6–11.3)
POTASSIUM SERPL-SCNC: 3.7 MMOL/L (ref 3.5–5.1)
RBC # BLD: 3.89 M/UL (ref 3.86–4.86)
UA COMPLETE W REFLEX CULTURE PNL UR: (no result)

## 2019-09-20 PROCEDURE — 96361 HYDRATE IV INFUSION ADD-ON: CPT

## 2019-09-20 PROCEDURE — 87086 URINE CULTURE/COLONY COUNT: CPT

## 2019-09-20 PROCEDURE — 80048 BASIC METABOLIC PNL TOTAL CA: CPT

## 2019-09-20 PROCEDURE — 96374 THER/PROPH/DIAG INJ IV PUSH: CPT

## 2019-09-20 PROCEDURE — 36415 COLL VENOUS BLD VENIPUNCTURE: CPT

## 2019-09-20 PROCEDURE — 81015 MICROSCOPIC EXAM OF URINE: CPT

## 2019-09-20 PROCEDURE — 99284 EMERGENCY DEPT VISIT MOD MDM: CPT

## 2019-09-20 PROCEDURE — 87088 URINE BACTERIA CULTURE: CPT

## 2019-09-20 PROCEDURE — 76705 ECHO EXAM OF ABDOMEN: CPT

## 2019-09-20 PROCEDURE — 85025 COMPLETE CBC W/AUTO DIFF WBC: CPT

## 2019-09-20 NOTE — XMS REPORT
Summary of Care

 Created on:2019



Patient:Mendy Ho

Sex:Female

:1995

External Reference #:[id number]





Demographics







 Address  [address]



   [place] [address]

 

 Mobile Phone  [phone]

 

 Home Phone  [phone]

 

 Phone  [phone]

 

 Email Address  [email or web address]

 

 Email Address  [email or web address]

 

 Preferred Language  English

 

 Marital Status  

 

 Zoroastrianism Affiliation  Unknown

 

 Race  White

 

 Ethnic Group   or 









Author







 Organization  [facility]

 

 Address  [address] [place] [address]









Support







 Name  Relationship  Address  Phone

 

 Lauren Ho  Unavailable  [address]  [phone]



     [place] [address]  

 

 La Muñiz  Unavailable  Unknown  [phone]



     [place] [address]  









Care Team Providers







 Name  Role  Phone

 

 Pcp, Patient Does Not Have A  Unavailable  [phone]

 

 Melinda Limon  Primary Care Provider  [phone]









Reason for Visit







 Reason  Comments

 

 Error  







Encounter Details







 Date  Type  Department  Care Team  Description

 

 2019  Telephone  [facility]



  Melinda Limon FNP



  Error



     [address] [facility]



  [address] [place] [address]



  [place], [place] [address] [phone] (Fax)  







Allergies







 Active Allergy  Reactions  Severity  Noted Date  Comments

 

 Oseltamivir Phosphate  Rash  Medium  2019  



documented as of this encounter (statuses as of 2019)



Medications







 Medication  Sig  Dispensed  Refills  Start Date  End Date  Status

 

 prenatal vit  Take 1 Packet by  30 Each  6  2019    Active



 33-iron-folic-dha  mouth daily.          



 (SELECT-OB + DHA) 29            



 mg iron-1 mg -250 mg            



 combo packIndications:            



 High risk pregnancy,            



 antepartum            

 

 ferrous sulfate 325 mg  Take 1 tablet by  60 tablet  3  2019    Active



 (65 mg iron)  mouth 2 (two)          



 tabletIndications:  times daily.          



 Anemia of mother in            



 pregnancy, antepartum            

 

 ascorbic acid, vitamin  Take 1 tablet by  90 tablet  3  2019    Active



 C, 500 mg  mouth 3 (three)          



 tabletIndications:  times daily.          



 Anemia of mother in            



 pregnancy, antepartum            



documented as of this encounter (statuses as of 2019)



Active Problems







 Problem  Noted Date

 

 Desires  (vaginal birth after ) trial  2019

 

 Abnormal maternal glucose tolerance, antepartum  2019









 Overview: 







 3hr GTT ordered









 Cramps of lower extremity  2019

 

 Maternal varicella, non-immune  2019









 Overview: 







 Address in Postpartum









 High risk pregnancy, antepartum  2019

 

 Previous  delivery affecting pregnancy, antepartum  2019

 

 Multiparity  2019

 

 Back pain, unspecified back location, unspecified back pain laterality,  2019



 unspecified chronicity  









 Pregnant  Estimated Date of Delivery  Comments

 

 Yes  2019  Based on last menstrual period of 2018



     (Exact Date)



documented as of this encounter (statuses as of 2019)



Immunizations







 Name  Administration Dates  Next Due

 

 Tdap  2019  



documented as of this encounter



Social History







 Tobacco Use  Types  Packs/Day  Years Used  Date

 

 Never Smoker        









 Smokeless Tobacco: Never Used      









 Alcohol Use  Drinks/Week  oz/Week  Comments

 

 No      









 Pregnant  Estimated Date of Delivery  Comments

 

 Yes  2019  Based on last menstrual period of 2018



     (Exact Date)









 Sex Assigned at Birth  Date Recorded

 

 Not on file  









 Job Start Date  Occupation  Industry

 

 Not on file  Not on file  Not on file









 Travel History  Travel Start  Travel End









 No recent travel history available.



documented as of this encounter



Last Filed Vital Signs

Not on filedocumented in this encounter



Plan of Treatment







 Date  Type  Specialty  Care Team  Description

 

 2019  Routine Prenatal Visit  [facility]  Melinda Limon, FNP



  



       [address] [place] [address]



  



       [phone] (Fax)  









 Health Maintenance  Due Date  Last Done  Comments

 

 MENINGOCOCCAL B VACCINES (1 of  2006    



 2 - Risk Bexsero 2-dose      



 series)      

 

 VARICELLA VACCINES (1 of 2 -  2009    



 13+ 2-dose series)      

 

 HPV VACCINES (1 - Female  2011    



 3-dose series)      

 

 INFLUENZA VACCINE (#1)  2019    

 

 CHLAMYDIA SCREENING  2020,  



     2019  

 

 PAP SMEAR  2022  

 

 DTaP,Tdap,and Td Vaccines (2 -  2029  



 Td)      

 

 PNEUMOCOCCAL 0-64 YEARS  Aged Out    No longer eligible based



 COMBINED SERIES      on patient's age to



       complete this topic



documented as of this encounter



Results

Not on filedocumented in this encounter



Insurance







 Payer  Benefit Plan /  Subscriber ID  Effective  Phone  Address  Type



   Group    [facility]  xxxxxxxxx  3/1/2019-Prese    [address]  Medicaid



 HEALTH CHOICE -  HEALTH CHOICE    nt    [id number]



  



 MANAGED MEDICAID [place]          [id number]  



documented as of this encounter



Advance Directives







 Name  Relationship  Healthcare Agent Relationship  Communication

 

 Lauren Ho  Mother  Primary healthcare agent  [phone]



       (Mobile)[phone] (Home)

## 2019-09-20 NOTE — XMS REPORT
Summary of Care

 Created on:2019



Patient:Mendy Ho

Sex:Female

:1995

External Reference #:[id number]





Demographics







 Address  [address]



   [place] [address]

 

 Mobile Phone  [phone]

 

 Home Phone  [phone]

 

 Phone  [phone]

 

 Email Address  [email or web address]

 

 Email Address  [email or web address]

 

 Preferred Language  English

 

 Marital Status  

 

 Anabaptist Affiliation  Unknown

 

 Race  White

 

 Ethnic Group   or 









Author







 Organization  [facility]

 

 Address  [address] [place] [address]









Support







 Name  Relationship  Address  Phone

 

 Lauren Ho  Unavailable  [address]  [phone]



     [place] [address]  

 

 La Muñiz  Unavailable  Unknown  [phone]



     [place] [address]  

 

 Dannie Garciaalbino  Unavailable  Unavailable  [phone]









Care Team Providers







 Name  Role  Phone

 

 Pcp, Patient Does Not Have A  Unavailable  [phone]

 

 Melinda Limon  Primary Care Provider  [phone]









Reason for Visit







 Reason  Comments

 

 Rx Concern/Question  







Encounter Details







 Date  Type  Department  Care Team  Description

 

 2019  Telephone  [facility]  Darinel Harvey,  Rx Concern/Question



     [address] [address]



  



     [address] [place] [address] [address] [place] [address] floor



  [phone]



  



     [place] [address] [phone] (Fax)  



     [phone]    







Allergies







 Active Allergy  Reactions  Severity  Noted Date  Comments

 

 Oseltamivir Phosphate  Rash  Medium  2019  



documented as of this encounter (statuses as of 2019)



Medications







 Medication  Sig  Dispensed  Refills  Start Date  End Date  Status

 

 ascorbic acid,  Take 1 tablet by  90 tablet  3  2019    Active



 vitamin C, 500 mg  mouth 3 (three)          



 tabletIndications  times daily.          



 : Anemia of            



 mother in            



 pregnancy,            



 antepartum            

 

 prenatal vitamin  Take 1 tablet by  100 tablet  3  2019    Active



 w/FA  mouth daily.          



 tabletIndications            



 : Status post            



 repeat low            



 transverse            



  section            

 

 docusate calcium  Take 1 capsule by  60 capsule  1  2019    Active



 240 mg  mouth once daily as          



 capsuleIndication  needed for          



 s: Status post  Constipation.          



 repeat low            



 transverse            



  section            

 

 ferrous sulfate  Take 1 tablet by  60 tablet  2  2019    Active



 325 mg (65 mg  mouth 2 (two) times          



 iron)  daily.          



 tabletIndications            



 : Status post            



 repeat low            



 transverse            



  section            

 

 ibuprofen 600 mg  Take 1 tablet by  60 tablet  1  2019    Active



 tabletIndications  mouth every 6 (six)          



 : Status post  hours as needed for          



 repeat low  Pain (scale 1-3) or          



 transverse  Pain (scale 4-6)          



  section  (Pain). Take with          



   food or milk.          

 

 HYDROcodone-aceta  Take 1 tablet by  28 tablet  0  2019  
Active



 minophen 5-325 mg  mouth every 6 (six)          



 tabletIndications  hours as needed          



 : Status post  (Pain) for up to 7          



 repeat low  days. Do not exceed          



 transverse  3 grams of          



  section  acetaminophen in 24          



   hours.          



documented as of this encounter (statuses as of 2019)



Active Problems







 Problem  Noted Date

 

 Jeramie Ritchie contractions  2019

 

 37 weeks gestation of pregnancy  2019

 

 Labor and delivery indication for care or intervention  2019

 

 Decreased fetal movements in third trimester  2019

 

 Obesity (BMI 30-39.9)  2019

 

 Desires  (vaginal birth after ) trial  2019

 

 Abnormal maternal glucose tolerance, antepartum  2019









 Overview: 







 3hr GTT ordered









 Cramps of lower extremity  2019

 

 Maternal varicella, non-immune  2019









 Overview: 







 Address in Postpartum









 High risk pregnancy, antepartum  2019

 

 Previous  delivery affecting pregnancy, antepartum  2019

 

 Multiparity  2019

 

 Back pain, unspecified back location, unspecified back pain laterality,  2019



 unspecified chronicity  



documented as of this encounter (statuses as of 2019)



Immunizations







 Name  Administration Dates  Next Due

 

 Tdap  2019  



documented as of this encounter



Social History







 Tobacco Use  Types  Packs/Day  Years Used  Date

 

 Never Smoker        









 Smokeless Tobacco: Never Used      









 Alcohol Use  Drinks/Week  oz/Week  Comments

 

 No      









 Sex Assigned at Birth  Date Recorded

 

 Not on file  









 Job Start Date  Occupation  Industry

 

 Not on file  Not on file  Not on file









 Travel History  Travel Start  Travel End









 No recent travel history available.



documented as of this encounter



Last Filed Vital Signs

Not on filedocumented in this encounter



Plan of Treatment







 Date  Type  Specialty  Care Team  Description

 

 2019  Nurse Visit  [facility]  Visit, Ang-Rmchp Nurse  

 

 10/03/2019  Routine Prenatal Visit  [facility]  Melinda Limon, FNP



  



       [address] [place] [address]



  



       [phone] (Fax)  









 Health Maintenance  Due Date  Last Done  Comments

 

 MENINGOCOCCAL B VACCINES (1 of  2006    



 2 - Risk Bexsero 2-dose      



 series)      

 

 INFLUENZA VACCINE (#1)  2019    

 

 HPV VACCINES (2 - Female  10/12/2019  2019  



 3-dose series)      

 

 VARICELLA VACCINES (2 of 2 -  10/12/2019  2019  



 13+ 2-dose series)      

 

 CHLAMYDIA SCREENING  2020,  



     2019  

 

 PAP SMEAR  2022  

 

 DTaP,Tdap,and Td Vaccines (2 -  2029  



 Td)      

 

 PNEUMOCOCCAL 0-64 YEARS  Aged Out    No longer eligible based



 COMBINED SERIES      on patient's age to



       complete this topic



documented as of this encounter



Results

Not on filedocumented in this encounter



Insurance







 Payer  Benefit Plan /  Subscriber ID  Effective  Phone  Address  Type



   Group    Dates      

 

 [facility]  xxxxxxxxx  3/1/2019-Prese    [address]  Medicaid



 HEALTH CHOICE -  HEALTH CHOICE    nt    [id number]



  



 MANAGED  MEDICAID        [place]  



 MEDICAID          [id number]  



documented as of this encounter



Advance Directives







 Name  Relationship  Healthcare Agent Relationship  Communication

 

 Lauren Ho  Mother  Primary healthcare agent  [phone]



       (Mobile)[phone] (Home)

## 2019-09-20 NOTE — XMS REPORT
Summary of Care

 Created on:2019



Patient:Mendy Ho

Sex:Female

:1995

External Reference #:[id number]





Demographics







 Address  [address]



   [place] [address]

 

 Mobile Phone  [phone]

 

 Home Phone  [phone]

 

 Phone  [phone]

 

 Email Address  [email or web address]

 

 Email Address  [email or web address]

 

 Preferred Language  English

 

 Marital Status  

 

 Jain Affiliation  Unknown

 

 Race  White

 

 Ethnic Group   or 









Author







 Organization  [facility]

 

 Address  [address] [place] [address]









Support







 Name  Relationship  Address  Phone

 

 Lauren Ho  Unavailable  [address]  [phone]



     [place] [address]  

 

 La Muñiz  Unavailable  Unknown  [phone]



     [place] [address]  









Care Team Providers







 Name  Role  Phone

 

 Pcp, Patient Does Not Have A  Unavailable  [phone]

 

 Melinda Limon [facility]  Primary Care Provider  [phone]









Reason for Visit







 Reason  Comments

 

 Abnormal Lab  Anemia







Encounter Details







 Date  Type  Department  Care Team  Description

 

 2019  Telephone  [facility]  Melinda Limon,  Abnormal Lab (
Anemia)



     [facility]



  



     [address] [place] [address]



  [address] [place] [address]



  



     [address] [phone] (Fax)  







Allergies







 Active Allergy  Reactions  Severity  Noted Date  Comments

 

 Oseltamivir Phosphate  Rash  Medium  2019  



documented as of this encounter (statuses as of 2019)



Medications







 Medication  Sig  Dispensed  Refills  Start Date  End Date  Status

 

 prenatal vit  Take 1 Packet by  30 Each  6  2019    Active



 33-iron-folic-dha  mouth daily.          



 (SELECT-OB + DHA) 29            



 mg iron-1 mg -250 mg            



 combo packIndications:            



 High risk pregnancy,            



 antepartum            

 

 ferrous sulfate 325 mg  Take 1 tablet by  60 tablet  3  2019    Active



 (65 mg iron)  mouth 2 (two)          



 tabletIndications:  times daily.          



 Anemia of mother in            



 pregnancy, antepartum            

 

 ascorbic acid, vitamin  Take 1 tablet by  90 tablet  3  2019    Active



 C, 500 mg  mouth 3 (three)          



 tabletIndications:  times daily.          



 Anemia of mother in            



 pregnancy, antepartum            



documented as of this encounter (statuses as of 2019)



Active Problems







 Problem  Noted Date

 

 Desires  (vaginal birth after ) trial  2019

 

 Abnormal maternal glucose tolerance, antepartum  2019









 Overview: 







 3hr GTT ordered









 Cramps of lower extremity  2019

 

 Maternal varicella, non-immune  2019









 Overview: 







 Address in Postpartum









 High risk pregnancy, antepartum  2019

 

 Previous  delivery affecting pregnancy, antepartum  2019

 

 Multiparity  2019

 

 Back pain, unspecified back location, unspecified back pain laterality,  2019



 unspecified chronicity  









 Pregnant  Estimated Date of Delivery  Comments

 

 Yes  2019  Based on last menstrual period of 2018



     (Exact Date)



documented as of this encounter (statuses as of 2019)



Immunizations







 Name  Administration Dates  Next Due

 

 Tdap  2019  



documented as of this encounter



Social History







 Tobacco Use  Types  Packs/Day  Years Used  Date

 

 Never Smoker        









 Smokeless Tobacco: Never Used      









 Alcohol Use  Drinks/Week  oz/Week  Comments

 

 No      









 Pregnant  Estimated Date of Delivery  Comments

 

 Yes  2019  Based on last menstrual period of 2018



     (Exact Date)









 Sex Assigned at Birth  Date Recorded

 

 Not on file  









 Job Start Date  Occupation  Industry

 

 Not on file  Not on file  Not on file









 Travel History  Travel Start  Travel End









 No recent travel history available.



documented as of this encounter



Last Filed Vital Signs

Not on filedocumented in this encounter



Plan of Treatment







 Date  Type  Specialty  Care Team  Description

 

 2019  Routine Prenatal Visit  [facility]  Melinda Limon, FNP



  



       [address] [place] [address]



  



       [phone] (Fax)  









 Health Maintenance  Due Date  Last Done  Comments

 

 MENINGOCOCCAL B VACCINES (1 of 2 -  2006    



 Risk Bexsero 2-dose series)      

 

 VARICELLA VACCINES (1 of 2 - 13+  2009    



 2-dose series)      

 

 HPV VACCINES (1 - Female 3-dose  2011    



 series)      

 

 INFLUENZA VACCINE (#1)  2019    

 

 CHLAMYDIA SCREENING  2020  

 

 PAP SMEAR  2022  

 

 DTaP,Tdap,and Td Vaccines (2 - Td)  2029  

 

 PNEUMOCOCCAL 0-64 YEARS COMBINED  Aged Out    No longer eligible based on



 SERIES      patient's age to complete



       this topic



documented as of this encounter



Results

Not on filedocumented in this encounter



Visit Diagnoses







 Diagnosis

 

 Anemia of mother in pregnancy, antepartum - Primary







 Anemia, antepartum



documented in this encounter



Insurance







 Payer  Benefit Plan /  Subscriber ID  Effective  Phone  Address  Type



   Group    [facility]  xxxxxxxxx  3/1/2019-Prese    [address]  Medicaid



 HEALTH CHOICE -  HEALTH [place]    nt    [id number]



  



 [facility]  MEDICAID        [place], [place]          [id number]  



documented as of this encounter



Advance Directives







 Name  Relationship  Healthcare Agent Relationship  Communication

 

 Lauren Ho  Mother  Primary healthcare agent  [phone]



       (Mobile)[phone] (Home)

## 2019-09-20 NOTE — XMS REPORT
Summary of Care

 Created on:2019



Patient:Mendy Ho

Sex:Female

:1995

External Reference #:[id number]





Demographics







 Address  [address]



   [place] [address]

 

 Mobile Phone  [phone]

 

 Home Phone  [phone]

 

 Phone  [phone]

 

 Email Address  [email or web address]

 

 Email Address  [email or web address]

 

 Preferred Language  English

 

 Marital Status  

 

 Scientology Affiliation  Unknown

 

 Race  White

 

 Ethnic Group   or 









Author







 Organization  [facility]

 

 Address  [address] [place] [address]









Support







 Name  Relationship  Address  Phone

 

 Lauren Ho  Unavailable  [address]  [phone]



     [place] [address]  

 

 La Muñiz  Unavailable  Unknown  [phone]



     [place] [address]  

 

 Dannie Teran  Unavailable  Unavailable  [phone]









Care Team Providers







 Name  Role  Phone

 

 Pcp, Patient Does Not Have A  Unavailable  [phone]

 

 Melinda Limon  Primary Care Provider  [phone]









Reason for Referral

 (Routine)





 Status  Reason  Specialty  Diagnoses /  Referred By  Referred To



       Procedures  Contact  Contact

 

 New Request    OB Satellites  Diagnoses



Status post repeat low transverse  section  Laureen Crenshaw,  



       



Procedures



DISCHARGE FOLLOW-UP:  OB/GYN CLINIC  MD



  



         [address] [place] [address]  



         [id number] [place] [address]  



         [address]



  



         Phone:  



         [phone]



  



         Fax: [phone]  









Reason for Visit

Auth/Cert





 Status  Reason  Specialty  Diagnoses /  Referred By Contact  Referred To 
Contact



       Procedures    

 

     Obstetrics  Diagnoses



labor    [address] [place] [address]



           [address]







           Phone: [phone]







           Fax: [phone]









Encounter Details







 Date  Type  Department  Care Team  Description

 

 2019 -  Hospital Encounter  Mother Baby Unit  Tena  37 weeks gestation



 2019    (J7C)



  Candice,  of pregnancy



     [address] [place] [address]  [id number]



  



     [address]



  [place]  



     [phone]  [id number] [phone]  



       (Fax)  







Allergies







 Active Allergy  Reactions  Severity  Noted Date  Comments

 

 Oseltamivir Phosphate  Rash  Medium  2019  



documented as of this encounter (statuses as of 2019)



Medications







 Medication  Sig  Dispensed  Refills  Start Date  End Date  Status

 

 ascorbic acid,  Take 1 tablet by  90 tablet  3  2019    Active



 vitamin C, 500  mouth 3 (three)          



 mg  times daily.          



 tabletIndication            



 s: Anemia of            



 mother in            



 pregnancy,            



 antepartum            

 

 prenatal vitamin  Take 1 tablet by  100 tablet  3  2019    Active



 w/FA  mouth daily.          



 tabletIndication            



 s: Status post            



 repeat low            



 transverse            



  section            

 

 docusate calcium  Take 1 capsule by  60 capsule  1  2019    Active



 240 mg  mouth once daily          



 capsuleIndicatio  as needed for          



 ns: Status post  Constipation.          



 repeat low            



 transverse            



  section            

 

 ferrous sulfate  Take 1 tablet by  60 tablet  2  2019    Active



 325 mg (65 mg  mouth 2 (two)          



 iron)  times daily.          



 tabletIndication            



 s: Status post            



 repeat low            



 transverse            



  section            

 

 ibuprofen 600 mg  Take 1 tablet by  60 tablet  1  2019    Active



 tabletIndication  mouth every 6          



 s: Status post  (six) hours as          



 repeat low  needed for Pain          



 transverse  (scale 1-3) or          



  section  Pain (scale 4-6)          



   (Pain). Take with          



   food or milk.          

 

 HYDROcodone-acet  Take 1 tablet by  28 tablet  0  2019  Active



 aminophen 5-325  mouth every 6        9  



 mg  (six) hours as          



 tabletIndication  needed (Pain) for          



 s: Status post  up to 7 days. Do          



 repeat low  not exceed 3 grams          



 transverse  of acetaminophen          



  section  in 24 hours.          

 

 prenatal vit  Take 1 Packet by  30 Each  6  2019  Discontinued



 33-iron-folic-dh  mouth daily.        9  



 a (SELECT-OB +            



 DHA) 29 mg            



 iron-1 mg -250            



 mg combo            



 packIndications:            



 High risk            



 pregnancy,            



 antepartum            

 

 ferrous sulfate  Take 1 tablet by  60 tablet  3  2019  
Discontinued



 325 mg (65 mg  mouth 2 (two)        9  



 iron)  times daily.          



 tabletIndication            



 s: Anemia of            



 mother in            



 pregnancy,            



 antepartum            



documented as of this encounter (statuses as of 2019)



Active Problems







 Problem  Noted Date

 

 Dunbar Ritchie contractions  2019

 

 37 weeks gestation of pregnancy  2019

 

 Labor and delivery indication for care or intervention  2019

 

 Decreased fetal movements in third trimester  2019

 

 Obesity (BMI 30-39.9)  2019

 

 Desires  (vaginal birth after ) trial  2019

 

 Abnormal maternal glucose tolerance, antepartum  2019









 Overview: 







 3hr GTT ordered









 Cramps of lower extremity  2019

 

 Maternal varicella, non-immune  2019









 Overview: 







 Address in Postpartum









 High risk pregnancy, antepartum  2019

 

 Previous  delivery affecting pregnancy, antepartum  2019

 

 Multiparity  2019

 

 Back pain, unspecified back location, unspecified back pain laterality,  2019



 unspecified chronicity  









 Pregnant  Comments

 

 Yes  



documented as of this encounter (statuses as of 2019)



Immunizations







 Name  Administration Dates  Next Due

 

 HPV9  2019  

 

 Tdap  2019  

 

 Varicella (varivax)(chicken pox)  2019  



documented as of this encounter



Social History







 Tobacco Use  Types  Packs/Day  Years Used  Date

 

 Never Smoker        









 Smokeless Tobacco: Never Used      









 Alcohol Use  Drinks/Week  oz/Week  Comments

 

 No      









 Pregnant  Comments

 

 Yes  









 Sex Assigned at Birth  Date Recorded

 

 Not on file  









 Job Start Date  Occupation  Industry

 

 Not on file  Not on file  Not on file









 Travel History  Travel Start  Travel End









 No recent travel history available.



documented as of this encounter



Last Filed Vital Signs







 Vital Sign  Reading  Time Taken  Comments

 

 Blood Pressure  103/60  2019  8:00 AM CDT  

 

 Pulse  83  2019  8:00 AM CDT  

 

 Temperature  36.8 C (98.2 F)  2019  8:00 AM CDT  

 

 Respiratory Rate  19  2019  8:00 AM CDT  

 

 Oxygen Saturation  100%  2019  8:00 AM CDT  

 

 Inhaled Oxygen Concentration  -  -  

 

 Weight  94.3 kg (208 lb)  2019  5:30 PM CDT  

 

 Height  -  -  

 

 Body Mass Index  35.7  2019 11:15 AM CDT  



documented in this encounter



Discharge Instructions

Jenna Ramirez RN - 2019Formatting of this note might be 
different from the original.

Patient Discharge Instructions

Instrucciones para el paciente al steven de greyson



Rubella:

Rubella screen IgG (no units)

Date Value

2019 Positive



Hgb/HCT:

HGB (g/dL)

Date Value

2019 8.6 (L)

,

HCT (%)

Date Value

2019 29.4 (L)



ABO Type:

ABO &amp; RH (no units)

Date Value

2019 O POSITIVE



Patient Discharge Instructions



Take Home Medications

These are medications ordered for you by your healthcare provider. Do not take 
any other medicationsor supplements unless advised by your healthcare provider.

Medicamentos tomados en casa:

Estos son medicamentos ordenados por finley proveedor de servicios mdicos.  No 
tome ningn otro medicamento o suplemento a menos que sea aconsejado por finley 
proveedor.



Current Discharge Medication List



START taking these medications

 Details

docusate calcium (SURFAK) 240 mg Take 240 mg by mouth once daily as needed for 
Constipation.

Qty: 60 capsule, Refills: 1

Start date: 2019

 Associated Diagnoses: Status post repeat low transverse  section



HYDROcodone-acetaminophen (NORCO 5) 1 tablet Take 1 tablet by mouth every 6 (six
) hours as needed (Pain). Do not exceed 3 grams of acetaminophen in 24 hours.

Qty: 28 tablet, Refills: 0

Start date: 2019, End date: 2019

 Associated Diagnoses: Status post repeat low transverse  section



ibuprofen (IBU) 600 mg Take 600 mg by mouth every 6 (six) hours as needed for 
Pain (scale 1-3) or Pain (scale 4-6) (Pain). Take with food or milk.

Qty: 60 tablet, Refills: 1

Start date: 2019

 Associated Diagnoses: Status post repeat low transverse  section



prenatal vitamin w/FA (PRENATABS RX) 1 tablet Take 1 tablet by mouth daily.

Qty: 100 tablet, Refills: 3

Start date: 2019

 Associated Diagnoses: Status post repeat low transverse  section





CONTINUE these medications which have CHANGED

 Details

ferrous sulfate 325 mg Take 325 mg by mouth 2 (two) times daily.

Qty: 60 tablet, Refills: 2

Start date: 2019

 Associated Diagnoses: Status post repeat low transverse  section





CONTINUE these medications which have NOT CHANGED

 Details

ascorbic acid (vitamin C) (VITAMIN C) 500 mg Take 500 mg by mouth 3 (three) 
times daily.

Qty: 90 tablet, Refills: 3

 Associated Diagnoses: Anemia of mother in pregnancy, antepartum





STOP taking these medications



 prenatal vit 33-iron-folic-dha (SELECT-OB + DHA) 1 Packet Comments:

Reason for Stopping:







Follow-up Appointments/Bernadette citas:

The following appointments have been made for you:

Las siguientes citas se hicieron para usted:



Provider ***  Place *** Date *** Time ***



Follow instructions as indicated below:

Siga las instrucciones que se le indican a continuacin:

Discharge Orders

Regular Diet; Texture: Regular.



Texture Regular.

Diabetic: No



Discharge Condition - GOOD



Discharge Condition: GOOD



Discharge Activity: - Ambulate with Pelvic Rest x 6 weeks



DISCHARGE FOLLOW-UP:  OB/GYN CLINIC

Order Comments: For  Patients - return to clinic in 5-7 days for 
staple removal.  If [facility] clinic, Nursing Staff to call clinic for follow-up 
appointment.



Follow-up with: BARRON Gleason

When: 1 Week



Patient Discharge Instructions -  Delivery

Order Comments: Keep incision clean and dry with soap and water in shower, dry 
gently with clean towel.  Return to ER if Temp &gt; 100.4 F, redness around or 
pus from incision, headache unresolved withpain medications, visual 
disturbances including double or blurry vision, seeing spots, or upper 
abdominal pain. Return to clinic for staple removal 5 to 7 days after the birth 
of your baby. Pelvic rest4 to 6 weeks, and no heavy lifting. Your appointment 
has been made for you.



Weight: In general, sudden weight gains or losses should be reported to your 
provider.  Cardiac patients should weigh daily and notify their provider for a 
weight gain of 3 pounds per day or 5 pounds per week.

Peso: En general, la perdida  aumento repentino de peso deber ser reportado 
a finley proveedor.  Lospacientes cardiacos, debern pesarse a diario y notificar 
a finley proveedor si tienen un aumento de 3 libras por da o 5 libras por semana.



Tobacco Avoidance: Follow recommendations below

Para evitar el Tabaco: Siga las recomendaciones a continuacin.



To schedule other appointments, call the [facility] at [phone]
[phone] [phone].  You may also make appointments online by going to 
[email or web address] and follow the Request Appointment quicklink.



Para programar otras citas, llame al [facility]ryst[facility] al [phone] o al [phone]. Gali alex puede hacer citas por internet, 
vaya a [email or web address] y kely la conexin con solicitud citas (
Request Appointment)



For questions regarding follow-up instructions call the [facility] at [phone] or [phone]

Para preguntas relacionadas con las instrucciones del seguimiento llame al [phone]
[phone]  [phone]



For worsening symptoms/changing condition/problems or questions:

Si los sntomas empeoran/cambios de condicin/problemas o preguntas, llame:

Non-emergency/urgent: Call the [facility] at [phone] or [phone]
[phone] or ***

No emergencia/urgencia: Llame al [facility]

Emergency: Go to the closest emergency room or call 229

Emergencia: Vaya a la karthik de emergencia ms nieto o llame al [phone]



Our Goal is to Always Provide You with Very Good Care!

We will be mailing you a survey, Please complete and return at your convenience.

Thank You

Nuestra [place] es Ofrecerle a Usted Siempre, lori muy buena atencin.

Nosotros le enviaremos lori encuesta, por favor llnela y regrsela segn 
pueda.

Heather



_____________________________________________________________________________



Multidisciplinary Discharge Instructions (may include diet, dressing changes, 
activity limits, written materials given to patient:



DIET: Eat a well balanced diet; drink 6-8 glasses of fluids daily; eat fruits 
and green, leafy vegetables.

DAILY ACTIVITIES:

1. As much as you feel able to do. Rest when you are tired.

2. Limitations: Specify; No heavy lifting other than your baby for 4 weeks if 
you had surgery.



TREATMENT AT HOME

1. Use a well-fitting bra to prevent breast engorgement

2. Resume intercourse as instructed by your physician.

3. Do not use douches or tampons for four weeks.

4. To help prevent urinary tract infection; after each urination and 
bowelmovement, wipe and dry from front to back and change peripad.

5. Follow discharge instructions regarding baby care.

6. Follow family planning instructions.

IMMEDIATE TREATMENT  Call Clinic or Your Physician

1. Increase in pain and tenderness of uterus.

2. Increased vaginal bleeding (bright red blood which soaks 2 pads in 1hour or 
pass large clots).

3. Foul smelling vaginal discharge.

4. Burning in the tube that empties the urine from the bladder.

5. Painful breast engorgement or cracked nipples.

6. Pain, discharges, or gaping incision.

7. Temperature greater than 38.0C or 100.4F

8. Pain and tenderness of calf or thigh muscles.

9. No bowel movements in 4 days.



Instrucciones multidisciplinarias para steven de greyson (pueden incluir, dieta, 
cambio de gasas, limitacin de actividades, material escrito para entregar al 
paciente):



DIETA: Mantenga lori dieta madyson balanceada; tome de 6 a 8 vasos de lquidos al 
da; coma frutas, hortalizas y vegetales.

ACTIVIDADES DIARIAS:

1. Mantngalas mientras pueda. Descanse si se siente cansada,

2. Limitaciones: No levante nada ms pesado que finley hijo por cuatro semanas si 
tuvo lori operacin.



FINLEY CUIDADO EN CASA:

1. Use un sostn que le ajuste madyson para evitar que se le llenen demasiados 
los pechos.

2. Resuma bernadette relaciones sexuales segn le indique finley doctor.

3. No use duchas ni tampones por 4 semanas.

4. Para ayudar a prevenir infecciones urinarias, despus de orinar y defecar,  
lmpiese de adelante hacia atrs y cmbiese la toalla sanitaria.

5. Siga las instuciones sobre el cuidado del beb.

6. Siga las instrucciones sobre planificacin familiar.



TRATAMIENTO INMEDIATOLlame a la clnica o a finley doctor si:

1. Aumenta el dolor o sensibilidad en la matriz.

2. Aumenta el sangramiento vaginal (andrea benjie brillante que pueda llenar 2  
toallas sanitarias en lori hora o si tiene cogulos grandes).

3. Tiene desecho vaginal con mal olor.

4. Tiene ardor cuando orina.

5. Siente dolor en los pechos porque estn muy llenos o grietas en los pezones.

6. Tiene dolor, drenaje o que se le habr la incisin.

7. Tiene temperatura mayor de 38.0 grados centgrados o ms de 100.4 grados 
Farenheit.

8. Tiene dolor  sensibilidad en los msculos de las pantorrillas o  de los 
muslos.

9. Tiene estreimiento por 4 escobedo.

_____________________________________________________________________________



Tobacco Avoidance



Exposure to tobacco either from smoking or from second hand (environmental) 
smoke or smokeless tobacco (snuff) is damaging to your health.  This 
information is to encourage everyone to avoid tobacco exposure.  It is 
recommended that you:

? If you smoke or use smokeless tobacco, we encourage you to quit.

? If you have already quit smoking, continue your good work!

? If you do not smoke or use smokeless tobacco, do not start.

? Avoid secondhand smoke.



Additional Resources

You may want to contact these organizations for further information on smoking 
and how to quit.

American Lung Association, http://www.lungusa.org/stop-smoking/

American Cancer Society, http://www.cancer.org/Healthy/StayAwayfromTobacco/index

American Heart Association, http://www.heart.org/HEARTORG/GettingHealthy/
QuitSmoking/Quit-Smoking_UC_001085_SubHomePage.jsp



Evitar El Tabaco

La exposicin al tabaco ya sea por fumar o por el humo de segunda mano (humo 
ambiental) y el tabacosin humo es perjudicial para finley krystin. Esta informacin 
es para animar a todos para que eviten la exposicin al tabaco. Se recomienda:

 Si usted fuma o usa tabaco sin humo le animamos a dejarlo.

 Si ha dejado de fumar, continu con finley buen trabajo!

 Si usted no fuma o usa tabaco sin humo no empiece a hacerlo.

 Evitar el humo de segunda mano.



Recursos Adicionales:

Usted puede comunicarse con estas organizaciones para tener ms informacin 
sobre fumar y yessy dejar de hacerlo.

American Lung Association (Asociacin Americana del Pulmn), http://
www.lungusa.org/stop-smoking/

American Cancer Society (Sociedad Americana del Cncer), http://www.cancer.org/
Healthy/StayAwayfromTobacco/index

American Heart Association (Asociacin Americana del Corazn), http://
www.heart.org/HEARTORG/GettingHealthy/QuitSmoking/Quit-Smoking_[facility]_001085_
SubHomePage.jsp

AttachmentsThe following attachments cannot be sent through Care 
Everywhere., After (English)Birth, Breast Care After  (English)
Postpartum Depression, Understanding (English)Breastfeeding, Common Questions 
About (English)Breastfeeding, Holding Your Baby While (English)Breastfeeding, 
Nutrition While (English)Incision Care (English)Bottle-feed, How to (English)
documented in this encounter



Progress Notes

Lauren Jean, WHCNP - 2019  8:22 AM CDTFormatting of this note 
might be different from the original.

[facility] Registered Nurse Faculty Note:



S:  No complaints.  Tolerating PO liquids.  (+)flatus.   No excessive vaginal 
bleeding. Good pain control on PO meds.  Pt feeling well.



O-

            Gen:  NAD, AOX3

            CV/Pulm: RRR/CTAB

            Abd:  Soft, ATTP, BS (+), fundus firm

            Inc:  C/D/I

            Ext:  No calf tenderness



Vitals:

Patient Vitals for the past 24 hrs:

 BP Temp Temp src Pulse Resp SpO2

19 0000 114/66 36.6 C (97.9 F) Oral 90 19 100 %

19 2000 110/65 36.6 C (97.8 F) Oral 86 19 99 %

19 1700 103/66 36.5 C (97.7 F) Oral 81 20 100 %

19 1300 114/63 36.7 C (98.1 F) Oral 98 19 99 %



Intake/Output Summary (Last 24 hours) at 2019 0822

Last data filed at 2019 0514

Gross per 24 hour

Intake 1000 ml

Output 2500 ml

Net -1500 ml



LABS:

WBC (10*3/L)

Date Value

2019 18.02 (H)

2019 11.89 (H)



HGB (g/dL)

Date Value

2019 8.6 (L)

2019 9.5 (L)



HCT (%)

Date Value

2019 29.4 (L)

2019 32.5 (L)



PLT (10*3/L)

Date Value

2019 118 (L)

2019 137 (L)



No results found for: AST, ALT, LDH, CREAT, URICACID, UPROTEIN



MEDICATIONS:

Current Facility-Administered Medications

Medication Dose Route Frequency Last Rate Last Dose

 bisacodyl (DULCOLAX) suppository 10 mg  10 mg Rectal QDAILYPRN

 diphenhydrAMINE (BENADRYL) 25 mg in NaCl 0.9% (NS) piggyback  25 mg IV 
Piggyback Q6HPRN

 diphenhydrAMINE (BENADRYL) tablet 25 mg  25 mg Oral Q6HPRN

 docusate calcium (SURFAK) capsule 240 mg  240 mg Oral QDAILYPRN   240 mg at 
19 0747

 human papillomav vac,9-jose(PF) (GARDASIL 9 (PF)) vial 0.5 mL  0.5 mL 
Intramuscular ONCE-PRIOR TODISCHARGE

 HYDROcodone-acetaminophen (NORCO 5) 5-325 mg tablet 1 tablet  1 tablet Oral 
Q6HPRN   1 tablet at19 0810

 HYDROcodone-acetaminophen (NORCO 5) 5-325 mg tablet 2 tablet  2 tablet Oral 
Q6HPRN   2 tablet at19 0514

 ibuprofen (IBU) tablet 600 mg  600 mg Oral Q6HPRN   600 mg at 19 0747

 magnesium hydroxide (MILK OF MAGNESIA) 400 mg/5 mL suspension 30 mL  30 mL 
Oral QDAILYPRN

 ondansetron (ZOFRAN (PF)) injection 4 mg  4 mg Slow IV Push Q8HPRN

 rho(D) immune globulin (RHOGAM) syringe 300 mcg  300 mcg Intramuscular ONCE

 simethicone (GAS RELIEF) chewable tablet 160 mg  160 mg Oral PC+HSPRN   160 
mg at 19 0747

 varicella virus vaccine live (VARIVAX (PF)) injection 0.5 mL  0.5 mL 
Subcutaneous ONCE-PRIOR TO DISCHARGE



Assessment:

Mendy Ho is a 24 year old  POD#2 s/p repeat LTCS on 2019 at 
2250 at 37w5d for failed TOLAC, uncomplicated. Patient is recovering well: 
hemodynamically stable, good UOP, pain well-controlled, vitals within normal 
limits.



Plan:

Postoperative Review:

- Admitted for: TOLAC

- Surgical procedure: Repeat Lower uterine transverse  section with no 
extension

- Skin incision: pfannenstiel

- Closure: sutures

- Estimated blood loss: 700 mL

- Intraoperative Complications: none

- Clinical trials: TXA

- Urine output: Adequate

- Preop H/H:  9.5/32.5

- Postop H/H: 8.6/29.4





Postoperative care:

- s/p c/s . Dressing dry and intact. No s/sx of infection at this visit. Denies 
any problems. VSS stable. labs was reviewed from a coinciding resident note 
from same day visit.  Post-op instructions given and pt understands. Ambulation 
encouraged.



MILLICENT Mendes  #[id number]  2019   8:22 AMElectronically signed by 
Lauren Jean CNP at 2019  8:38 AM Angélica Gonzales MD -   7:25 AM CDTFormatting of this note might be different from the 
original.

POST-OPERATIVE POSTPARTUM PROGRESS NOTE

2019 7:25 AM



Subjective: Overnight patient had no complaints.  Her pain is well controlled 
on oral pain medications. She is tolerating a regular diet. She has passed 
flatus. She is ambulating without difficulty. Lochia is Scant. She is urinating 
without caballero. Patient denies chest pain, SOB, n/v, headache, RUQ pain, vision 
changes, dizziness.



Objective:

VITALS:

Patient Vitals for the past 24 hrs:

 BP Temp Temp src Pulse Resp SpO2

19 0000 114/66 36.6 C (97.9 F) Oral 90 19 100 %

19 2000 110/65 36.6 C (97.8 F) Oral 86 19 99 %

19 1700 103/66 36.5 C (97.7 F) Oral 81 20 100 %

19 1300 114/63 36.7 C (98.1 F) Oral 98 19 99 %

19 0800 109/63 36.6 C (97.8 F) Oral 87 20 99 %



I/O:



Intake/Output Summary (Last 24 hours) at 2019 0725

Last data filed at 2019 0514

Gross per 24 hour

Intake 1125 ml

Output 3200 ml

Net -2075 ml



PE:

General: patient alert and in no acute distress

Lungs: clear to auscultation bilaterally

Cardiology: regular rate and rhythm, no murmur

Abdomen: normal tenderness to palpation, soft, bowel sounds present. Fundus is 
firm and at umbilicus

Incision: Clean, dry, and intact, no erythema or induration

Extremities: no clubbing, cyanosis, or edema

: deferred



MEDS:

Current Facility-Administered Medications

Medication Dose Route Frequency Last Rate Last Dose

 bisacodyl (DULCOLAX) suppository 10 mg  10 mg Rectal QDAILYPRN

 diphenhydrAMINE (BENADRYL) 25 mg in NaCl 0.9% (NS) piggyback  25 mg IV 
Piggyback Q6HPRN

 diphenhydrAMINE (BENADRYL) tablet 25 mg  25 mg Oral Q6HPRN

 docusate calcium (SURFAK) capsule 240 mg  240 mg Oral QDAILYPRN   240 mg at 
19 0742

 human papillomav vac,9-jose(PF) (GARDASIL 9 (PF)) vial 0.5 mL  0.5 mL 
Intramuscular ONCE-PRIOR TODISCHARGE

 HYDROcodone-acetaminophen (NORCO 5) 5-325 mg tablet 1 tablet  1 tablet Oral 
Q6HPRN   1 tablet at19 0810

 HYDROcodone-acetaminophen (NORCO 5) 5-325 mg tablet 2 tablet  2 tablet Oral 
Q6HPRN   2 tablet at19 0514

 ibuprofen (IBU) tablet 600 mg  600 mg Oral Q6HPRN   600 mg at 19 2318

 magnesium hydroxide (MILK OF MAGNESIA) 400 mg/5 mL suspension 30 mL  30 mL 
Oral QDAILYPRN

 ondansetron (ZOFRAN (PF)) injection 4 mg  4 mg Slow IV Push Q8HPRN

 rho(D) immune globulin (RHOGAM) syringe 300 mcg  300 mcg Intramuscular ONCE

 simethicone (GAS RELIEF) chewable tablet 160 mg  160 mg Oral PC+HSPRN   160 
mg at 19 2317

 varicella virus vaccine live (VARIVAX (PF)) injection 0.5 mL  0.5 mL 
Subcutaneous ONCE-PRIOR TO DISCHARGE



LABS:

WBC (10*3/L)

Date Value

2019 18.02 (H)

2019 11.89 (H)



HGB (g/dL)

Date Value

2019 8.6 (L)

2019 9.5 (L)



HCT (%)

Date Value

2019 29.4 (L)

2019 32.5 (L)



PLT (10*3/L)

Date Value

2019 118 (L)

2019 137 (L)



Assessment:

Mendy Ho is a 24 year old  POD#2 s/p repeat LTCS on 2019 at 
2250 at 37w5d for failed TOLAC, uncomplicated. Patient is recovering well: 
hemodynamically stable, good UOP, pain well-controlled, vitals within normal 
limits.



Plan:

Postoperative Review:

- Admitted for: TOLAC

- Surgical procedure: Repeat Lower uterine transverse  section with no 
extension

- Skin incision: pfannenstiel

- Closure: sutures

- Estimated blood loss: 700 mL

- Intraoperative Complications: none

- Clinical trials: TXA

- Urine output: Adequate

- Preop H/H:  9.5/32.5

- Postop H/H: 8.6/29.4



Antepartum course reviewed

- 1 h144, nml 3 hr, seroneg, Ri, VZVni,O+/neg, GBSneg, Pap NIL19

- H/H, plt:9.5/ 32.5,137on 19

-[facility]



Postoperative care:

- Diet: Advance as tolerated

- Fluid: Encourage oral intake

- Activity: Encourage ambulation and incentive spirometry

- Pain: Norco and Ibuprofen

- DVT prophylaxis: SCDs and TEDs when not ambulating



Discharge Planning

- Contraception: Declined

- Vaccines: Varivax and Gardasil

- Follow up in 5-7 days for incision check and/or staple removal at [facility]

- Follow Up: Postpartum follow-up in 3-6 weeks at [facility]

- Dispo: Anticipate discharge POD#2



Baby's Status

- APGARs 8  , 9

- Weight: 3590 g

- Location: Rooming with mom

-Male, declines circumcision



Dispo: Patient is POD#2 s/p repeat LTCS. She is meeting all milestones. 
Anticipate discharge on today. Varivax and Gardasil



Angélica Romero MDElectronically signed by Angélica Romero MD at 2019  7:27 AM Angélica Gonzales MD - 2019  6:19 AM CDTFormatting of 
this note might be different from the original.

POST-OPERATIVE POSTPARTUM PROGRESS NOTE

2019 6:19 AM



Subjective: Overnight patient had no complaints.  Her pain is not well 
controlled on oral pain medications. She is tolerating a regular diet. She has 
not passed flatus. She is not ambulating without difficulty. Lochia is 
moderate. She is urinating with caabllero. Patient denies chest pain, SOB, n/v, 
headache, RUQ pain, vision changes, dizziness.



Objective:

VITALS:

Patient Vitals for the past 24 hrs:

 BP Temp Temp src Pulse Resp SpO2

19 0400 115/69 36.7 C (98.1 F) Oral 74 20 99 %

19 0131 116/59 36.9 C (98.4 F) Oral 76 20 99 %

19 0040 110/61 37.2 C (98.9 F) Axillary 92 17 98 %

19 0020 102/60   87 12 98 %

19 0005    76 20 100 %

19 2340 116/53 37.3 C (99.1 F) Axillary 76 20 100 %

19 2149  37.7 C (99.8 F) Axillary   

19 2120 108/85   88 18 100 %

19 2100 99/81 38 C (100.4 F) Axillary 124 18 100 %

19 2030 112/71   113 18 100 %

19 2000 101/63 37.9 C (100.3 F) Axillary 127 17 100 %

19 1930 100/56   83 18 100 %

19 1900 121/61 37.7 C (99.9 F) Axillary 87 18 100 %

19 1830 102/57   90 18 98 %

19 1800 104/62 37.1 C (98.8 F) Oral 88 18 99 %

19 1700 106/67   80 18 99 %

19 1630 100/51 37.5 C (99.5 F) Axillary 90 18 99 %

19 1550 97/52   80 18 99 %

19 1530 101/54   81  98 %

19 1500 100/59 36.9 C (98.5 F) Oral 74 18 99 %

19 1430 114/70   78 18 99 %

19 1400 115/70   76 18 99 %

19 1330 111/70   63  100 %

19 1300 116/74 37.4 C (99.4 F) Axillary 80 18 99 %

19 1230 109/65   88 18 99 %

19 1155 107/69   88 18 99 %

19 1130 105/68   70 18 99 %

19 1100 111/63 36.9 C (98.4 F) Oral 84 18 99 %

19 1030 114/70   80 18 99 %

19 1000 108/73   62 18 99 %

19 0930 107/69   74 18 99 %

19 0900 113/67 36.4 C (97.6 F) Oral 72 18 99 %

19 0830 110/68   88 18 99 %

19 0800 112/65   74 18 99 %

19 0730 98/58   86 18 99 %

19 0700 95/59 36.8 C (98.2 F) Oral 90 18 99 %

19 0640 100/52   79 16 100 %

19 0620 108/54   80 18 99 %



I/O:



Intake/Output Summary (Last 24 hours) at 2019 0619

Last data filed at 2019 0530

Gross per 24 hour

Intake 3141 ml

Output 3100 ml

Net 41 ml



PE:

General: patient alert and in no acute distress

Lungs: clear to auscultation bilaterally

Cardiology: regular rate and rhythm, no murmur

Abdomen: normal tenderness to palpation, soft, bowel sounds present. Fundus is 
firm and at umbilicus

Incision: Covered in clean, dry bandage

Extremities: no clubbing, cyanosis, or edema

: deferred



MEDS:

Current Facility-Administered Medications

Medication Dose Route Frequency Last Rate Last Dose

 bisacodyl (DULCOLAX) suppository 10 mg  10 mg Rectal QDAILYPRN

 diphenhydrAMINE (BENADRYL) 25 mg in NaCl 0.9% (NS) piggyback  25 mg IV 
Piggyback Q6HPRN

 diphenhydrAMINE (BENADRYL) tablet 25 mg  25 mg Oral Q6HPRN

 docusate calcium (SURFAK) capsule 240 mg  240 mg Oral QDAILYPRN

 HYDROcodone-acetaminophen (NORCO 5) 5-325 mg tablet 1 tablet  1 tablet Oral 
Q6HPRN   1 tablet at19 0144

 HYDROcodone-acetaminophen (NORCO 5) 5-325 mg tablet 2 tablet  2 tablet Oral 
Q6HPRN

 ibuprofen (IBU) tablet 600 mg  600 mg Oral Q6HPRN   600 mg at 19 0513

 lactated ringers IV infusion 1,000 mL  1,000 mL IV Infusion ONCE

 LR 1000 mL + oxytocin 20 units IV Solution   IV Infusion ONCE

 LR 1000 mL + oxytocin 20 units IV Solution   IV Infusion CONTINUOUS 125 mL/
hr at 19 0047

 magnesium hydroxide (MILK OF MAGNESIA) 400 mg/5 mL suspension 30 mL  30 mL 
Oral QDAILYPRN

 ondansetron (ZOFRAN (PF)) injection 4 mg  4 mg Slow IV Push Q8HPRN

 oxytocin (PITOCIN) 40 Units in lactated ringers 1,000 mL IV infusion   IV 
Infusion PRN

 rho(D) immune globulin (RHOGAM) syringe 300 mcg  300 mcg Intramuscular ONCE

 simethicone (GAS RELIEF) chewable tablet 160 mg  160 mg Oral PC+HSPRN



LABS:

WBC (10*3/L)

Date Value

2019 18.02 (H)

2019 11.89 (H)



HGB (g/dL)

Date Value

2019 8.6 (L)

2019 9.5 (L)



HCT (%)

Date Value

2019 29.4 (L)

2019 32.5 (L)



PLT (10*3/L)

Date Value

2019 118 (L)

2019 137 (L)



Assessment:

Mendy Ho is a 24 year old  POD#1 s/p repeat LTCS on 2019 at 
2250 at 37w5d for failed TOLAC, uncomplicated. Patient is recovering well: 
hemodynamically stable, good UOP, pain well-controlled, vitals within normal 
limits.



Plan:

Postoperative Review:

- Admitted for: TOLAC

- Surgical procedure: Repeat Lower uterine transverse  section with no 
extension

- Skin incision: pfannenstiel

- Closure: sutures

- Estimated blood loss: 700 mL

- Intraoperative Complications: none

- Clinical trials: TXA

- Urine output: 1.3 cc/kg/hr

- Preop H/H:  9.5/32.5

- Postop H/H: 8.6/29.4



Antepartum course reviewed

- 1 h 144, nml 3 hr, sero neg, Ri, VZVni, O+/neg, GBS neg, Pap NIL 19

- H/H, plt: 9.5 / 32.5, 137 on 19

- [facility]



Postoperative care:

- Diet: Advance as tolerated

- Fluid: Encourage oral intake

- Activity: Encourage ambulation and incentive spirometry

- Pain: Norco and Ibuprofen

- DVT prophylaxis: SCDs and TEDs when not ambulating



Discharge Planning

- Contraception: Declined

- Vaccines: Varivax and Gardasil

- Follow up in 5-7 days for incision check and/or staple removal at [facility]

- Follow Up: Postpartum follow-up in 3-6 weeks at [facility]

- Dispo: Anticipate discharge POD#2



Baby's Status

- APGARs 8  , 9

- Weight: 3590 g

- Location: Rooming with mom

-desires circumcision



Dispo: Patient is POD#1 s/p repeat LTCS and will be 24 hr at 2250 on 2019. 
She needs to meet the following milestones: Pain controlled on oral medications
, passing flatus, ambulating without difficulty, urinating without the caballero, 
24 hr incision check. Anticipate discharge on POD#2. Varivax and Gardasil



Angélica Romero MDElectronically signed by Laureen Crenshaw MD at 2019  
3:28 PM CDT

Associated attestation - Laureen Crenshaw MD - 2019  3:28 PM CDTI 
personally examined the patient on 2019 and agree with Dr. Reinoso's 
resident note as written.  I actively participated in the decision-making 
process.  Please see the resident's note for additional details.



Ezra Mehta MD - 2019 10:01 PM CDTOb Progress Note



Patient for Repeat CS due to arrest of active phase in the setting of TOLAC



Ezra Mehta MDElectronically signed by Ezra Mehta MD at 2019 12:04 AM 
Anastasiia Matute MD - 2019  3:56 PM CDTIntrapartum Progress 
Note

2019 3:57 PM



Subjective: Patient has no complaints



Objective:

Vitals last 24 hours:

Temp:  [36.4 C (97.6 F)-37.4 C (99.4 F)] 36.9 C (98.5 F)

Pulse:  [] 81

Resp:  [16-18] 18

BP: ()/(47-85) 101/54



Intake/Output :

No intake/output data recorded.

I/O last 3 completed shifts:

In: 380 [I.V.:380]

Out: 50 [Urine:50]

Fetal Assessment



Active movement: Yes

Mode: FSE



Uterine Activity:



Mode: IUPC

Contractions (number / 10 minute): 4-5

Contraction duration (seconds): 50-70

Resting tone: Soft



Membrane Status



Membrane status: Artificial

Rupture date: 19

Rupture time: 630

Amniotic fluid color: Clear



Cervical Exam



6 / 80 % / -2 at 1545



Assessment/Plan:

Mendy Ho is a 24 year old  at 37w5d

OB Assessment: TOLAC

OB Plan: s/p FB,Pit@0415

Additional Comments/Detail: FHT reassuring, pt comfortable will continue to 
monitor

Ripening Agent: Oxytocin

Intrapartum Plan: Increase Pitocin induction



Anastasiia Encinas MD

Electronically signed by Anastasiia Encinas MD at 2019  3:57 PM 
Anastasiia Matute MD - 2019 11:55 AM CDTIntrapartum Progress 
Note

2019 11:55 AM



Subjective: Patient has no complaints



Objective:

Vitals last 24 hours:

Temp:  [36.4 C (97.6 F)-37.1 C (98.8 F)] 36.9 C (98.4 F)

Pulse:  [] 70

Resp:  [16-20] 18

BP: ()/(47-92) 105/68



Intake/Output :

No intake/output data recorded.

I/O last 3 completed shifts:

In: 380 [I.V.:380]

Out: 50 [Urine:50]

Fetal Assessment



Active movement: Yes

Mode: FSE



Uterine Activity:



Mode: IUPC

Contractions (number / 10 minute): 6

Contraction duration (seconds): 50-70

Resting tone: Soft



Membrane Status



Membrane status: Artificial

Rupture date: 19

Rupture time: 630

Amniotic fluid color: Clear



Cervical Exam



5 / 80 % / -2



Assessment/Plan:

Mendy Ho is a 24 year old  at 37w5d

OB Assessment: TOLAC

OB Plan: s/p FB,Pit@0415

Additional Comments/Detail: FHT reassuring, will continue to monitor

Ripening Agent: Oxytocin

Intrapartum Plan: Increase Pitocin induction



Anastasiia Encinas MD

Electronically signed by Anastasiia Encinas MD at 2019 11:56 AM 
Anastasiia Matute MD - 2019  7:56 AM CDTIntrapartum Progress 
Note

2019 7:56 AM



Subjective: Patient has no complaints. Comfortable with epidural



Objective:

Vitals last 24 hours:

Temp:  [36.4 C (97.6 F)-37.1 C (98.8 F)] (P) 36.8 C (98.2 F)

Pulse:  [] (P) 90

Resp:  [16-20] (P) 18

BP: ()/(47-92) (P) 95/59



Intake/Output :

No intake/output data recorded.

I/O last 3 completed shifts:

In: 380 [I.V.:380]

Out: 50 [Urine:50]

Fetal Assessment



Active movement: Yes

Mode: (P) FSE



Uterine Activity:



Mode: (P) IUPC

Contractions (number / 10 minute): (P) 5

Contraction duration (seconds): (P) 50-90

Resting tone: Soft



Membrane Status



Membrane status: Artificial

Rupture date: 19

Rupture time: 630

Amniotic fluid color: Clear



Cervical Exam



5 / 50 % / -2 at 0645



Assessment/Plan:

Mendy Ho is a 24 year old  at 37w5d

OB Assessment: TOLAC

OB Plan: s/p FB,Pit@0415

Additional Comments/Detail: intermittent variable decels noted, position changed
, on O2.

Ripening Agent: Oxytocin

Intrapartum Plan: Increase Pitocin induction



Previous c/s x 1

- documented LTCS due to nonreassuring fetal status

- 51.6% chance of successful 

- counseled regarding r/b/a of TOLAC vs. Repeat c/s, patient desires TOLAC



Antepartum course reviewed

- 1 h 144, nml 3 hr, sero neg, Ri, VZVni, O+/neg, GBS neg, Pap NIL 19

- H/H, plt: 9.5 / 32.5, 137 on 19

- [facility]



Fetus

- Presentation on admission: cephalic, DVP 5cm

- posterior placenta

- Leopold 7#



Pt with intermittent variables, no change in station or increased pain at this 
time. Position changed. Will turn off pitocin and continue to monitor.



Discussed with Dr. Elder Encinas MD

Electronically signed by Yahir Downing at 2019  9:32 AM CDT

Associated attestation - Yahir Downing - 2019  9:32 AM CDTAgree 
with plan, discussed with resident.documented in this encounter



Plan of Treatment







 Date  Type  Specialty  Care Team  Description

 

 2019  Nurse Visit  [facility]  Visit, Ang-Rmchp Nurse  

 

 10/03/2019  Routine Prenatal Visit  [facility]  Melinda Limon, P



  



       [address] [place] [address]



  



       [phone] (Fax)  









 Health Maintenance  Due Date  Last Done  Comments

 

 MENINGOCOCCAL B VACCINES (1 of  2006    



 2 - Risk Bexsero 2-dose      



 series)      

 

 VARICELLA VACCINES (1 of 2 -  2009    



 13+ 2-dose series)      

 

 HPV VACCINES (1 - Female  2011    



 3-dose series)      

 

 INFLUENZA VACCINE (#1)  2019    

 

 CHLAMYDIA SCREENING  2020,  



     2019  

 

 PAP SMEAR  2022  

 

 DTaP,Tdap,and Td Vaccines (2 -  2029  



 Td)      

 

 PNEUMOCOCCAL 0-64 YEARS  Aged Out    No longer eligible based



 COMBINED SERIES      on patient's age to



       complete this topic



documented as of this encounter



Procedures







 Procedure Name  Priority  Date/Time  Associated  Comments



       Diagnosis  

 

 CBC WITH  Routine  2019  4:33    Results for this



 DIFFERENTIAL    AM CDT    procedure are in



         the results



         section.

 

 CBC WITH DIFF  Routine  2019  4:33    Results for this



     AM CDT    procedure are in



         the results



         section.

 

 VENOUS CORD GAS  ASAP  2019 10:55    Results for this



     PM CDT    procedure are in



         the results



         section.

 

 ARTERIAL CORD GAS  ASAP  2019 10:55    Results for this



     PM CDT    procedure are in



         the results



         section.

 

  SECTION  Level 1 (within  2019 10:11  s/p repeat c/s  



   1 hour)  PM CDT  for FTP w/spinal  









   Case Notes







   TOLAC FTP, repeat c/s









 GALV ONLY - SYPHILIS  ASAP  2019  2:41 PM CDT    Results for this



 IGG/IGM        procedure are in the



         results section.

 

 HEPATITIS B SURFACE  ASAP  2019  2:41 PM CDT    Results for this



 ANTIGEN        procedure are in the



         results section.

 

 RHO (D) IMMUNE GLOBULIN  Routine  2019  2:07 PM CDT    Results for this



         procedure are in the



         results section.

 

 TYPE AND SCREEN  ASAP  2019  2:07 PM CDT    Results for this



         procedure are in the



         results section.



documented in this encounter



Results

CBC WITH DIFFERENTIAL (2019  4:33 AM CDT)





 Component  Value  Ref Range  Performed At  Pathologist



         Signature

 

 WBC  18.02 (H)  4.30 - 11.10  [facility]  



     10*3/L  SERVICES  

 

 RBC  3.74 (L)  3.93 - 5.25  [facility]  



     10*6/L  SERVICES  

 

 HGB  8.6 (L)  11.6 - 15.0  [facility]  



     g/dL  SERVICES  

 

 HCT  29.4 (L)  35.7 - 45.2 %  [facility]  



       SERVICES  

 

 MCV  78.6 (L)  80.6 - 95.5  [facility]  



     fL  SERVICES  

 

 MCH  23.0 (L)  25.9 - 32.8  [facility]  



     pg  SERVICES  

 

 MCHC  29.3 (L)  31.6 - 35.1  [facility]  



     g/dL  SERVICES  

 

 RDW-SD  47.8  39.0 - 49.9  [facility]  



     fL  SERVICES  

 

 RDW-CV  17.6 (H)  12.0 - 15.5 %  [facility]  



       SERVICES  

 

 PLT  118 (L)  166 - 358  [facility]  



     10*3/L  SERVICES  

 

 MPV  11.8  9.5 - 12.9 fL  [facility]  



       SERVICES  

 

 IPF %  12.1 (H)Comment:  1.3 - 7.7 %  [facility]  



   Platelet count    SERVICES  



   measured by      



   fluorescence method.      

 

 NRBC/100 WBC  0.0  0.0 - 10.0  [facility]  



     /100 WBCs  SERVICES  

 

 NRBC x10^3  <0.01  10*3/L  [facility]  



       SERVICES  

 

 GRAN MAT (NEUT) %  87.0  %  [facility]  



       SERVICES  

 

 IMM GRAN %  0.40  %  [facility]  



       SERVICES  

 

 LYMPH %  7.0  %  [facility]  



       SERVICES  

 

 MONO %  5.3  %  [facility]  



       SERVICES  

 

 EOS %  0.1  %  [facility]  



       SERVICES  

 

 BASO %  0.2  %  [facility]  



       SERVICES  

 

 GRAN MAT  15.69 (H)  1.88 - 7.09  [facility]  



 x10^3(ANC)    10*3/uL  SERVICES  

 

 IMM GRAN x10^3  0.07 (H)  0.00 - 0.06  [facility]  



     10*3/uL  SERVICES  

 

 LYMPH x10^3  1.26 (L)  1.32 - 3.29  [facility]  



     10*3/uL  SERVICES  

 

 MONO x10^3  0.96 (H)  0.33 - 0.92  [facility]  



     10*3/uL  SERVICES  

 

 EOS x10^3  <0.03 (L)  0.03 - 0.39  [facility]  



     10*3/uL  SERVICES  

 

 BASO x10^3  0.03  0.01 - 0.07  [facility]  



     10*3/uL  SERVICES  

 

 BANDS  MARKED INCREASED (A)    [facility]  



       SERVICES  









 Specimen

 

 Blood - VENOUS









 Performing Organization  Address  City/State/Zipcode  Phone Number

 

 [facility]  CLIA:  [id number], [address] [place] [address]  [phone]
[phone]



   [facility]    



ARTERIAL CORD GAS (2019 10:55 PM CDT)





 Component  Value  Ref Range  Performed At  Pathologist Signature

 

 BASE EXCESS, CORD  -2.4  mEq/L  [facility]  



       SERVICES  

 

 AC PH, CORD (BEAKER)  7.28  7.18 - 7.38  [facility]  



       SERVICES  

 

 PC02, CORD  55  32 - 66 mmHg  [facility]  



       SERVICES  

 

 PO2, CORD  13  10 - 30 mmHg  [facility]  



       SERVICES  

 

 BICARBONATE, CORD  25  17 - 27 mEq/L  [facility]  



       SERVICES  









 Specimen

 

 Blood - CORD









 Performing Organization  Address  City/[facility]/Zipcode  Phone Number

 

 [facility]  CLIA:  [id number], [address] [place] [address] [id number] [phone]
[phone]



   [facility]    



VENOUS CORD GAS (2019 10:55 PM CDT)





 Component  Value  Ref Range  Performed At  Pathologist Signature

 

 VENOUS BASE EXCESS,  -2.4  mEq/L  [facility]  



 CORD      SERVICES  

 

 VENOUS PH, CORD  7.36  7.25 - 7.45  [facility]  



       SERVICES  

 

 VENOUS PC02, CORD  42  27 - 49 mmHg  [facility]  



       SERVICES  

 

 VENOUS PO2, CORD  22  17 - 41 mmHg  [facility]  



       SERVICES  

 

 VENOUS BICARBONATE,  23  12 - 29 mEq/L  [facility]  



 CORD      SERVICES  









 Specimen

 

 Blood - CORD









 Performing Organization  Address  City/[facility]/Zipcode  Phone Number

 

 [facility]  CLIA:  [id number], [address] [place] [address] [id number] [phone]
[phone]



   [facility]    



GALV ONLY - SYPHILIS IGG/IGM (2019  2:41 PM CDT)





 Component  Value  Ref Range  Performed At  Pathologist Signature

 

 Syphilis IgG/IgM  Non-reactive  Non-reactive  [facility]  



       SERVICES  









 Specimen

 

 Blood - VENOUS









 Narrative  Performed At

 

 Non-reactive - No serologic evidence of T. pallidum  [facility]



 infection. Cannot exclude incubating or early syphilis.  



 Submit a second specimen in 2-4 weeks if syphilis is  



 clinically suspected.



  



 Equivocal - Further testing to follow.



  



 Reactive - Further testing to follow.  









 Performing Organization  Address  City/[facility]  Phone Number

 

 [facility]  CLIA:  [id number], [address] [place] [address]  [phone]
[phone]



   [facility]    



Hepatitis B Surface Antigen (2019  2:41 PM CDT)





 Component  Value  Ref Range  Performed At  Pathologist



         Signature

 

 HBsAg  HEPATITIS B  Negative  [facility]  



   SURFACE ANTIGEN    SERVICES  



   NEGATIVE      

 

 HBsAg  0.05    [facility]  



 Semi-Quantitative      SERVICES  









 Specimen

 

 Blood - VENOUS









 Performing Organization  Address  City/[facility]  Phone Number

 

 [facility]  CLIA:  [id number], [address] [place] [address] [phone]
[phone]



   [facility]    



RHO (D) IMMUNE GLOBULIN (2019  2:07 PM CDT)





 Component  Value  Ref Range  Performed At  Pathologist [facility]

 

 RHIG CANDIDATE?  No- see comment    LAB  



   Comment:      



   Patient is not a candidate for RhIg- Patient is Rh Positive.      



   Performed at [facility]      



   [address] [place] [address]      



   Toll Free: [phone]      



   CLIA No. [id number]      



         









 Specimen

 

 Blood - VENOUS









 Performing Organization  Address  City/[facility]  Phone Number

 

 [facility]      

 

 LAB      



Type and Screen - ONCE ASAP (2019  2:07 PM CDT)





 Component  Value  Ref Range  Performed At  Pathologist Signature

 

 ABO & RH  O POSITIVE    LAB  



   Comment:      



   Performed at [facility]      



   [address] [place] [address]      



   Toll Free: [phone]      



   CLIA No. [id number]      



         

 

 IAT  Negative    LAB  



   Comment:      



   Performed at [facility]      



   [address] [place] [address]      



   Toll Free: [phone]      



   CLIA No. [id number]      



         









 Specimen

 

 Blood - VENOUS









 Performing Organization  Address  City/State/Zipcode  Phone Number

 

 [facility]      

 

 LAB      



documented in this encounter



Visit Diagnoses







 Diagnosis

 

 Status post repeat low transverse  section - Primary







  delivery, without mention of indication, unspecified as to episode of 
care

 

 37 weeks gestation of pregnancy







 Pregnant state, incidental

 

 Previous  delivery affecting pregnancy, antepartum







 Previous  delivery, antepartum condition or complication

 

 Maternal varicella, non-immune







 Supervision of other high-risk pregnancy

 

 Labor and delivery indication for care or intervention







 Unspecified indication for care or intervention related to labor and delivery,



 unspecified as to episode of care

 

 Decreased fetal movements in third trimester







 Decreased fetal movements, affecting management of mother, antepartum

 

 Obesity (BMI 30-39.9)







 Obesity, unspecified



documented in this encounter



Administered Medications







 Medication Order  MAR Action  Action Date  Dose  Rate  Site

 

 docusate calcium (SURFAK) capsule  Given  2019  7:47 AM CDT  240 mg    



 240 mg



          



 240 mg, Oral, QDAILYPRN, Starting          



 19 at 0116, Until          



 Discontinued, Routine,          



 Constipation          









 Given  2019  7:42 AM CDT  240 mg    









   









 HYDROcodone-acetaminophen (NORCO 5) 5-325  Given  2019  8:10 AM CDT  1 
tablet    



 mg tablet 1 tablet



          



 1 tablet, Oral, Q6HPRN, Starting 19 at 0116, Until Discontinued,          



 Routine, Pain (scale 4-6), If uncontrolled          



 by Ibuprofen          









 Given  2019  6:37 AM CDT  1 tablet    

 

 Given  2019  1:44 AM CDT  1 tablet    









   









 HYDROcodone-acetaminophen (NORCO 5) 5-325  Given  2019 11:42 AM CDT  2 
tablets    



 mg tablet 2 tablet



          



 2 tablet, Oral, Q6HPRN, Starting 19 at 0116, Until Discontinued,          



 Routine, Pain (scale 7-10), If          



 uncontrolled by Ibuprofen          









 Given  2019  5:14 AM CDT  2 tablets    

 

 Given  2019  9:15 PM CDT  2 tablets    









   









 ibuprofen (IBU) tablet 600 mg



  Given  2019  7:47 AM CDT  600 mg    



 600 mg, Oral, Q6HPRN, Starting 19          



 at 0116, Until Discontinued, Routine, Pain          



 (scale 1-3)          









 Given  2019 11:18 PM CDT  600 mg    

 

 Given  2019  5:39 PM CDT  600 mg    









   









 simethicone (GAS RELIEF) chewable tablet 160  Given  2019 11:42 AM CDT  
160 mg    



 mg



          



 160 mg, Oral, PC+HSPRN, Starting 19          



 at 0116, Until Discontinued, Routine, Gas          









 Given  2019  7:47 AM CDT  160 mg    

 

 Given  2019 11:17 PM CDT  160 mg    









   









 









 Medication Order  MAR Action  Action Date  Dose  Rate  Site

 

 acetaminophen (TYLENOL) tablet  Given  2019 10:07 PM CDT  650 mg    



 650 mg



          



 650 mg, Oral, ONCE, 1 dose, Thu          



 19 at 2300, Routine          









   









 azithromycin (ZITHROMAX) 500 mg in NaCl 0.9%  Given  2019 10:30 PM CDT  
500 mg    



 (NS) 250 mL VIAL-MATE IV piggyback



          



 500 mg, IV Piggyback, O.R. HOLDING ONCE, 1          



 dose, Starting Thu 19 at 2151, Until          



 u 19 at 2330, 250 mL, Reason for          



 Anti-Infective: Surgical Prophylaxis,          



 Surgical Prophylaxis: OB/GYN, Duration of          



 therapy: within 24 hours of surgery          









   









 ceFAZolin in dextrose (iso-os) (ANCEF) 2  Given  2019 10:08 PM CDT  2 g 
   



 gram/100 mL Piggyback 2 g



          



 2 g (2,000 mg), IV Piggyback, O.R. HOLDING          



 ONCE, 1 dose, Starting Thu 19 at 2150,          



 Until u 19 at 2208, 100 mL, Reason for          



 Anti-Infective: Surgical Prophylaxis, Surgical          



 Prophylaxis: OB/GYN, Duration of therapy:          



 within 24 hours of surgery          









   









 D5W-LR IV infusion 1,000 mL



  New Bag  2019  3:00 PM CDT  1,000 mL  125 mL/hr  



 at 125 mL/hr, IV Infusion,          



 CONTINUOUS, Starting 19          



 at 1345, Until 19 at          



 0116, Routine          









   









 human papillomav  Given  2019 11:42 AM CDT  0.5 mL    Right Deltoid-IM



 vac,9-jose(PF) (GARDASIL 9          



 (PF)) vial 0.5 mL



          



 0.5 mL, Intramuscular,          



 ONCE-PRIOR TO DISCHARGE, 1          



 dose, Starting 19 at          



 0654, Until Sat 9/14/19 at          



 1142, Routine, Give vaccine          



 prior to discharge          









   









 HYDROcodone-acetaminophen (NORCO 5) 5-325  Given  2019 12:43 AM CDT  1 
tablet    



 mg tablet 1 tablet



          



 1 tablet, Oral, Q6HPRN, 1 dose, Starting          



 19 at 0000, Until 19 at          



 0043, Routine, Pain (scale 4-6)          









   









 lactated ringers IV infusion  New Bag  2019 10:00 PM CDT  1,000 mL  125 
mL/hr  



 1,000 mL



          



 at 125 mL/hr, 1,000 mL, IV          



 Infusion, CONTINUOUS, Starting          



 19 at 2200, Until 19 at 0116, Routine          









   









 lactated ringers IV infusion  New Bag  2019  9:42 AM CDT  1,000 mL  125 
mL/hr  



 1,000 mL



          



 at 125 mL/hr, 1,000 mL, IV          



 Infusion, ONCE, 1 dose, 19 at 0130, Routine          









   









 lactated ringers IV infusion 500 mL



  Restarted  2019  4:46 PM CDT    999 mL/hr  



 at 999 mL/hr, 500 mL, IV Infusion,          



 ONCE, 1 dose, u 19 at 0500,          



 Routine          









   









 lactated ringers IV infusion 500  New Bag  2019  5:18 AM CDT  500 mL  
999 mL/hr  



 mL



          



 at 999 mL/hr, 500 mL, IV Infusion,          



 PRN - SEE INSTRUCTIONS, 1 dose,          



 Starting 19 at 0349,          



 Until 19 at 0518, Routine          









   









 LR 1000 mL + oxytocin 20  Rate Change  2019  9:00 PM  12 aracely-units/min
  36 mL/hr  



 units IV Solution



    CDT      



 2 aracely-units/min (6          



 mL/hr), at 6 mL/hr, IV          



 Infusion, TITRATE,          



 Starting Thu 19 at          



 0349, Until 19          



 at 0116, ASAP, Oxytocin          



 Induction / Augmentation          



 of Labor.          









 Dose/Rate Verify  2019  6:30 PM CDT  10 aracely-units/min  30 mL/hr  

 

 Dose/Rate Verify  2019  6:00 PM CDT  10 aracely-units/min  30 mL/hr  









   









 LR 1000 mL + oxytocin 20 units IV  New Bag  2019 12:47 AM CDT    125 mL/
hr  



 Solution



          



 at 125 mL/hr, IV Infusion, CONTINUOUS,          



 Starting 19 at 0015, Until Sat          



 9/14/19 at 0014, ASAP          









   









 nalbuphine (NUBAIN) injection 10 mg



  Given  2019  5:48 PM CDT  10 mg    



 10 mg, Intravenous, ONCE, 1 dose, 19          



 at 1830, Routine          









   









 nalbuphine (NUBAIN) injection 10 mg



  Given  2019  9:29 PM CDT  10 mg    



 10 mg, Intravenous, ONCE, 1 dose, 19          



 at 2230, Routine          









   









 proMETHazine (PHENERGAN) 25 mg in NaCl 0.9%  Given  2019  6:40 PM CDT  
25 mg    



 (NS) 50 mL IV piggyback



          



 25 mg, IV Piggyback, ONCE, 1 dose, 19 at 1830, ASAP          









   









 proMETHazine (PHENERGAN) 25 mg in NaCl 0.9%  Given  2019 10:10 PM CDT  
25 mg    



 (NS) 50 mL IV piggyback



          



 25 mg, IV Piggyback, ONCE, 1 dose, 19 at 2230, Routine          









   









 sodium citrate-citric acid (BICITRA) 500-334  Given  2019  5:18 AM CDT  
30 mL    



 mg/5 mL solution 30 mL



          



 30 mL, Oral, PRE-PROCEDURE ONCE, 1 dose,          



 Starting u 19 at 0349, Until u          



 19 at 0518, Routine, Surgery/Procedure          









   









 sodium citrate-citric acid (BICITRA) 500-334  Given  2019 10:07 PM CDT  
30 mL    



 mg/5 mL solution 30 mL



          



 30 mL, Oral, PRE-PROCEDURE ONCE, 1 dose,          



 Starting Thu 19 at 2150, Until Thu          



 19 at 2207, Routine, Surgery          









   









 varicella virus vaccine live  Given  2019  2:33 PM CDT  0.5 mL    Left 
Upper Arm-SC



 (VARIVAX (PF)) injection 0.5          



 mL



          



 0.5 mL, Subcutaneous,          



 ONCE-PRIOR TO DISCHARGE, 1          



 dose, Starting Fri 19          



 at 0654, Until Sat 19          



 at 1433, Routine, Give          



 vaccine prior to discharge          









   



documented in this encounter



Insurance







 Payer  Benefit Plan /  Subscriber ID  Effective  Phone  Address  Type



   Group    [facility]  xxxxxxxxx  3/1/2019-Prese    [address]  Medicaid



 HEALTH CHOICE -  HEALTH CHOICE    nt    [id number]



  



 MANAGED MEDICAID [place], [place]          [id number]  









 Guarantor Name  Account Type  Relation to  Date of Birth  Phone  Billing



     Patient      Address

 

 VicMendy  Personal/Family  Self  1995  [phone] [address]



         (Home)  [address]







           [place]



           [address]



documented as of this encounter



Advance Directives







 Name  Relationship  Healthcare Agent Relationship  Communication

 

 Lauren Vic  Mother  Primary healthcare agent  [phone]



       (Mobile)[phone] (Home)

## 2019-09-20 NOTE — XMS REPORT
Summary of Care

 Created on:2019



Patient:Mendy Ho

Sex:Female

:1995

External Reference #:[id number]





Demographics







 Address  [address]



   [place] [address]

 

 Mobile Phone  [phone]

 

 Home Phone  [phone]

 

 Phone  [phone]

 

 Email Address  [email or web address]

 

 Email Address  [email or web address]

 

 Preferred Language  English

 

 Marital Status  

 

 Hinduism Affiliation  Unknown

 

 Race  White

 

 Ethnic Group   or 









Author







 Organization  [facility]

 

 Address  [address] [place] [address]









Support







 Name  Relationship  Address  Phone

 

 Lauren Ho  Unavailable  [address]  [phone]



     [place] [address]  

 

 [place]  Unavailable  Unknown  [phone]



     [place] [address]  









Care Team Providers







 Name  Role  Phone

 

 Pcp, Patient Does Not Have A  Unavailable  [phone]

 

 Melinda Limon FNP  Primary Care Provider  [phone]









Reason for Visit







 Reason  Comments

 

 Assessment  cramping, lower abdomen pain







Encounter Details







 Date  Type  Department  Care Team  Description

 

 2019  Telephone  [facility]-  Melinda Limon,  Assessment (
cramping,



     [place]



  FNP



  lower abdomen pain)



     [address] [facility]



  [address] [place], [place] [address] [phone] (Fax)  







Allergies







 Active Allergy  Reactions  Severity  Noted Date  Comments

 

 Oseltamivir Phosphate  Rash  Medium  2019  



documented as of this encounter (statuses as of 2019)



Medications







 Medication  Sig  Dispensed  Refills  Start Date  End Date  Status

 

 prenatal vit  Take 1 Packet by  30 Each  6  2019    Active



 33-iron-folic-dha  mouth daily.          



 (SELECT-OB + DHA) 29            



 mg iron-1 mg -250 mg            



 combo packIndications:            



 High risk pregnancy,            



 antepartum            

 

 ferrous sulfate 325 mg  Take 1 tablet by  60 tablet  3  2019    Active



 (65 mg iron)  mouth 2 (two)          



 tabletIndications:  times daily.          



 Anemia of mother in            



 pregnancy, antepartum            

 

 ascorbic acid, vitamin  Take 1 tablet by  90 tablet  3  2019    Active



 C, 500 mg  mouth 3 (three)          



 tabletIndications:  times daily.          



 Anemia of mother in            



 pregnancy, antepartum            



documented as of this encounter (statuses as of 2019)



Active Problems







 Problem  Noted Date

 

 Desires  (vaginal birth after ) trial  2019

 

 Abnormal maternal glucose tolerance, antepartum  2019









 Overview: 







 3hr GTT ordered









 Cramps of lower extremity  2019

 

 Maternal varicella, non-immune  2019









 Overview: 







 Address in Postpartum









 High risk pregnancy, antepartum  2019

 

 Previous  delivery affecting pregnancy, antepartum  2019

 

 Multiparity  2019

 

 Back pain, unspecified back location, unspecified back pain laterality,  2019



 unspecified chronicity  









 Pregnant  Estimated Date of Delivery  Comments

 

 Yes  2019  Based on last menstrual period of 2018



     (Exact Date)



documented as of this encounter (statuses as of 2019)



Immunizations







 Name  Administration Dates  Next Due

 

 Tdap  2019  



documented as of this encounter



Social History







 Tobacco Use  Types  Packs/Day  Years Used  Date

 

 Never Smoker        









 Smokeless Tobacco: Never Used      









 Alcohol Use  Drinks/Week  oz/Week  Comments

 

 No      









 Pregnant  Estimated Date of Delivery  Comments

 

 Yes  2019  Based on last menstrual period of 2018



     (Exact Date)









 Sex Assigned at Birth  Date Recorded

 

 Not on file  









 Job Start Date  Occupation  Industry

 

 Not on file  Not on file  Not on file









 Travel History  Travel Start  Travel End









 No recent travel history available.



documented as of this encounter



Last Filed Vital Signs

Not on filedocumented in this encounter



Plan of Treatment







 Date  Type  Specialty  Care Team  Description

 

 2019  Routine Prenatal Visit  [facility]  Melinda Limon, FNP



  



       [address] [place] [address]



  



       [phone] (Fax)  









 Health Maintenance  Due Date  Last Done  Comments

 

 MENINGOCOCCAL B VACCINES (1 of  2006    



 2 - Risk Bexsero 2-dose      



 series)      

 

 VARICELLA VACCINES (1 of 2 -  2009    



 13+ 2-dose series)      

 

 HPV VACCINES (1 - Female  2011    



 3-dose series)      

 

 INFLUENZA VACCINE (#1)  2019    

 

 CHLAMYDIA SCREENING  2020,  



     2019  

 

 PAP SMEAR  2022  

 

 DTaP,Tdap,and Td Vaccines (2 -  2029  



 Td)      

 

 PNEUMOCOCCAL 0-64 YEARS  Aged Out    No longer eligible based



 COMBINED SERIES      on patient's age to



       complete this topic



documented as of this encounter



Results

Not on filedocumented in this encounter



Insurance







 Payer  Benefit Plan /  Subscriber ID  Effective  Phone  Address  Type



   Group    [facility]  xxxxxxxxx  3/1/2019-Prese    [address]  Medicaid



 HEALTH CHOICE -  HEALTH CHOICE    nt    [id number]



  



 MANAGED MEDICAID [place]          [id number]  



documented as of this encounter



Advance Directives







 Name  Relationship  Healthcare Agent Relationship  Communication

 

 Lauren Vic  Mother  Primary healthcare agent  [phone]



       (Mobile)[phone] (Home)

## 2019-09-20 NOTE — XMS REPORT
Summary of Care

 Created on:2019



Patient:Mendy Ho

Sex:Female

:1995

External Reference #:[id number]





Demographics







 Address  [address]



   [place] [address]

 

 Mobile Phone  [phone]

 

 Home Phone  [phone]

 

 Phone  [phone]

 

 Email Address  [email or web address]

 

 Email Address  [email or web address]

 

 Preferred Language  English

 

 Marital Status  

 

 Hoahaoism Affiliation  Unknown

 

 Race  White

 

 Ethnic Group   or 









Author







 Organization  [facility]

 

 Address  [address] [place] [address]









Support







 Name  Relationship  Address  Phone

 

 Lauren Ho  Unavailable  [address]  [phone]



     [place] [address]  

 

 Lazeina Muñiz  Unavailable  Unknown  [phone]



     [place] [address]  









Care Team Providers







 Name  Role  Phone

 

 Pcp, Patient Does Not Have A  Unavailable  [phone]

 

 Melinda Limon  Primary Care Provider  [phone]









Reason for Visit







 Reason  Comments

 

 Prenatal Care  







Encounter Details







 Date  Type  Department  Care Team  Description

 

 2019  Routine Prenatal  [facility]-  Melinda Limon  High risk 
pregnancy, antepartum (Primary Dx);



   Visit  HECTOR Walls



  Previous  delivery affecting pregnancy, antepartum;



     [address]



  [address]  Multiparity;



     [place]  [address]



  Back pain, unspecified back location, unspecified back pain laterality, 
unspecified chronicity



     [address]



  [place]  



     [phone] [address] [phone]  



       (Fax)  







Allergies







 Active Allergy  Reactions  Severity  Noted Date  Comments

 

 Oseltamivir Phosphate  Rash  Medium  2019  



documented as of this encounter (statuses as of 2019)



Medications







 Medication  Sig  Dispensed  Refills  Start Date  End Date  Status

 

 prenatal vit  Take 1 Packet by  30 Each  6  2019    Active



 33-iron-folic-dha  mouth daily.          



 (SELECT-OB + DHA) 29            



 mg iron-1 mg -250 mg            



 combo packIndications:            



 High risk pregnancy,            



 antepartum            



documented as of this encounter (statuses as of 2019)



Active Problems







 Problem  Noted Date

 

 Abnormal maternal glucose tolerance, antepartum  2019









 Overview: 







 3hr GTT ordered









 Cramps of lower extremity  2019

 

 Maternal varicella, non-immune  2019









 Overview: 







 Address in Postpartum









 High risk pregnancy, antepartum  2019

 

 Previous  delivery affecting pregnancy, antepartum  2019

 

 Multiparity  2019

 

 Back pain, unspecified back location, unspecified back pain laterality,  2019



 unspecified chronicity  









 Pregnant  Estimated Date of Delivery  Comments

 

 Yes  2019  Based on last menstrual period of 2018



     (Exact Date)



documented as of this encounter (statuses as of 2019)



Immunizations







 Name  Administration Dates  Next Due

 

 Tdap  2019  



documented as of this encounter



Social History







 Tobacco Use  Types  Packs/Day  Years Used  Date

 

 Never Smoker        









 Smokeless Tobacco: Never Used      









 Alcohol Use  Drinks/Week  oz/Week  Comments

 

 No      









 Pregnant  Estimated Date of Delivery  Comments

 

 Yes  2019  Based on last menstrual period of 2018



     (Exact Date)









 Sex Assigned at Birth  Date Recorded

 

 Not on file  









 Job Start Date  Occupation  Industry

 

 Not on file  Not on file  Not on file









 Travel History  Travel Start  Travel End









 No recent travel history available.



documented as of this encounter



Last Filed Vital Signs







 Vital Sign  Reading  Time Taken  Comments

 

 Blood Pressure  104/61  2019 12:52 PM CDT  

 

 Pulse  84  2019 12:52 PM CDT  

 

 Temperature  36.3 C (97.4 F)  2019 12:52 PM CDT  

 

 Respiratory Rate  16  2019 12:52 PM CDT  

 

 Oxygen Saturation  -  -  

 

 Inhaled Oxygen Concentration  -  -  

 

 Weight  92.3 kg (203 lb 8 oz)  2019 12:52 PM CDT  

 

 Height  162.6 cm (5' 4")  2019 12:52 PM CDT  

 

 Body Mass Index  34.93  2019 12:52 PM CDT  



documented in this encounter



Progress Notes

Melinda Limon, ILDEFONSOP - 2019 12:45 PM CDTFormatting of this note might 
be different from the original.

Chief complaint:

Chief Complaint

Patient presents with

 Prenatal Care



HPI CC: Follow Up Prenatal Visit



Mendy Ho is a 24 year old, , /White female. Patient's 
last menstrual period was 2018 (exact date).  She is 34w3d with an 
intrauterine pregnancy.  Her estimated date of delivery is 2019, by Last 
Menstrual Period.  She has no complaints today. She reports +FM and denies 
contractions, LOF and bleeding today. Reviewed OB/ER, PIH, labor and movement 
precautions. Encouragedpatient to go to [facility] for any signs and symptoms  of 
labor (regular contractions, LOF, vaginal bleeding or decrease fetal movement. 
Patient denies current or past physical, sexual or emotional abuse.



Histories

OB History

 Para Term  AB Living

3 1 1   1 1

SAB TAB Ectopic Multiple Live Births

1       1



# Outcome Date GA Lbr Tom/2nd Weight Sex Delivery Anes PTL Lv

3 Current

2 SAB 18

1 Term 17 39w0d  8 lb 13 oz (3.997 kg) F  SEC   ARNULFO



OB History

 Para Term  AB Living

3 1 1   1 1

SAB TAB Ectopic Multiple Live Births

1       1



# Outcome Date GA Lbr Tom/2nd Weight Sex Delivery Anes PTL Lv

3 Current

2 SAB 18

1 Term 17 39w0d  8 lb 13 oz (3.997 kg) F  SEC   ARNULFO



Past Medical History:

Diagnosis Date

 Abnormal maternal glucose tolerance, antepartum 2019

 Anemia



Family History

Problem Relation Age of Onset

 Depression Mother

 Hypertension Mother

 Hypothyroidism Mother

 Diabetes Father

 High cholesterol Father

 Hypertension Father

 Pancreatic Cancer Maternal Grandmother

 Cancer Maternal Grandmother

 Lung Cancer Maternal Grandfather

 Cancer Maternal Grandfather

 Heart Paternal Grandfather

 Depression Brother



Family Status

Relation Name Status

 Mo  Alive

 Fa  Alive

 MGMo  

 MGFa  

 PGMo  Alive

 PGFa  Alive

 Bro  (Not Specified)



Past Surgical History:

Procedure Laterality Date

  SECTION



Social History



Socioeconomic History

 Marital status: 

  Spouse name: Not on file

 Number of children: Not on file

 Years of education: Not on file

 Highest education level: Not on file

Occupational History

 Not on file

Social Needs

 Financial resource strain: Not on file

 Food insecurity:

  Worry: Not on file

  Inability: Not on file

 Transportation needs:

  Medical: Not on file

  Non-medical: Not on file

Tobacco Use

 Smoking status: Never Smoker

 Smokeless tobacco: Never Used

Substance and Sexual Activity

 Alcohol use: No

 Drug use: No

 Sexual activity: Yes

  Partners: Male

  Birth control/protection: None

  Comment: last intercourse 19

Lifestyle

 Physical activity:

  Days per week: Not on file

  Minutes per session: Not on file

 Stress: Not on file

Relationships

 Social connections:

  Talks on phone: Not on file

  Gets together: Not on file

  Attends Jehovah's witness service: Not on file

  Active member of club or organization: Not on file

  Attends meetings of clubs or organizations: Not on file

  Relationship status: Not on file

 Intimate partner violence:

  Fear of current or ex partner: Not on file

  Emotionally abused: Not on file

  Physically abused: Not on file

  Forced sexual activity: Not on file

Other Topics Concern

 Not on file

Social History Narrative

 Not on file



Social History



Substance and Sexual Activity

Sexual Activity Yes

 Partners: Male

 Birth control/protection: None

 Comment: last intercourse 19







Labs

No new labs



Radiology

No new radiology.



Allergies

Mendy is allergic to tamiflu [oseltamivir phosphate].



Medications

Mendy has a current medication list which includes the following prescription(
s): prenatal vit 33-iron-folic-dha.



Review of Systems

Eyes: Negative for visual disturbance.

Cardiovascular: Negative for leg swelling.

Gastrointestinal: Negative for abdominal pain, nausea and vomiting.

Genitourinary: Negative for vaginal bleeding, vaginal discharge and pelvic pain.

Neurological: Negative for headaches.



/61 (BP Location: Right arm, Patient Position: Sitting, BP CUFF SIZE: 
Adult Small)  | Pulse 84 | Temp 36.3 C (97.4 F) (Oral)  | Resp 16  | Ht 5' 4
" (1.626 m)  | Wt 203 lb 8 oz (92.3 kg)  | LMP 2018 (Exact Date)  | BMI 
34.93 kg/m

Pregravid BMI: 30.0



Physical Exam



PRENATAL PHYSICAL:

General Exam:

Neurological: Normal

Abdomen: Normal

Extremities: Normal



Pelvic Exam:

Uterus: 35cm Weeks





Assessment/Plan

High risk pregnancy, antepartum  (primary encounter diagnosis)

Previous  delivery affecting pregnancy, antepartum

Multiparity

Comment: Routine Prenatal Visit

Plan: POCT URINALYSIS W SPECIFIC GRAVITY, CBC WITH

      DIFF, GC &amp; CHLAMYDIA AMPLIFIED ASSAY, GROUP B

      STREPTOCOCCUS BY PCR, CANCELED: GROUP B

      STREPTOCOCCUS BY PCR

      Denies zika virus risk, signs and symptoms such as fever,rash,joint pain, 
conjunctivitis (red eyes), muscle pain, headaches; outside [place] travel to areas 
affected by zika, and FOB exposure to zika.Educated on use of mosquito 
repellent.



Back pain, unspecified back location, unspecified back pain laterality, 
unspecified chronicity

Comment: patient complains of lower back pain on and off

Plan: Reviewed OB/ER, PIH, labor and movement precautions. Encouraged patient 
to go to [facility] for any signs and symptoms  of labor (regular contractions, LOF, 
vaginal bleeding or decrease fetal movement.



Return to clinic in 2 weeks.

Discussed treatment options.

Medications as ordered.

Reviewed patient instructions and provided printed copy.



This visit did not involve counseling and coordination that comprised more than 
50% of the visit time.

HECTOR Ulloa  2019  1:16 PM

Electronically signed by Melinda Limon FNP at 2019  1:16 PM 
CDTdocumented in this encounter



Plan of Treatment







 Date  Type  Specialty  Care Team  Description

 

 2019  Routine Prenatal Visit  [facility]  Melinda Limon FNP



  



       [address] [place] [address]



  



       [phone] (Fax)  









 Name  Type  Priority  Associated Diagnoses  Order Schedule

 

 CBC WITH DIFF  LAB  Routine  High risk pregnancy,  1 Occurrences starting



       antepartum  2019 until



         2019

 

 GC & CHLAMYDIA AMPLIFIED  LAB  Routine  High risk pregnancy,  1 Occurrences 
starting



 ASSAY      antepartum  2019 until



         2019

 

 GROUP B STREPTOCOCCUS BY  LAB  Routine  High risk pregnancy,  1 Occurrences 
starting



 PCR      antepartum  2019 until



         2019









 Health Maintenance  Due Date  Last Done  Comments

 

 MENINGOCOCCAL B VACCINES (1 of 2 -  2006    



 Risk Bexsero 2-dose series)      

 

 VARICELLA VACCINES (1 of 2 - 13+  2009    



 2-dose series)      

 

 HPV VACCINES (1 - Female 3-dose  2011    



 series)      

 

 INFLUENZA VACCINE (#1)  2019    

 

 CHLAMYDIA SCREENING  2020  

 

 PAP SMEAR  2022  

 

 DTaP,Tdap,and Td Vaccines (2 - Td)  2029  

 

 PNEUMOCOCCAL 0-64 YEARS COMBINED  Aged Out    No longer eligible based on



 SERIES      patient's age to complete



       this topic



documented as of this encounter



Procedures







 Procedure Name  Priority  Date/Time  Associated Diagnosis  Comments

 

 POCT URINALYSIS  Routine  2019 12:54 PM  High risk pregnancy,  Results 
for this



     CDT  antepartum  procedure are in



         the results



         section.



documented in this encounter



Results

POCT URINALYSIS W SPECIFIC GRAVITY (2019 12:54 PM CDT)





 Component  Value  Ref Range  Performed At  Pathologist Signature

 

 POCT U SP GRAV  .  1.005 - 1.025 mg/dl    

 

 POCT PH U  .  5 - 8 mg/dl    

 

 POCT U LEUK EST  .  Negative - Negative    

 

 POCT U NIT  .  Negative - Negative    

 

 POCT U PROT  neg  Negative - Negative    

 

 POCT U GLU  neg  Negative - Negative    

 

 POCT U KETONE  .  Negative - Negative    

 

 POCT U UROBILI  .  0.2 - 1 mg/dl    

 

 POCT U BILI  .  Negative - Negative    

 

 POCT U BLD  .  Negative - Negative    

 

 POCT U COLOR  .      

 

 POCT U APPEAR        









 Specimen

 

 Urine - URINE, CLEAN CATCH



documented in this encounter



Visit Diagnoses







 Diagnosis

 

 High risk pregnancy, antepartum - Primary

 

 Previous  delivery affecting pregnancy, antepartum







 Previous  delivery, antepartum condition or complication

 

 Multiparity

 

 Back pain, unspecified back location, unspecified back pain laterality, 
unspecified



 chronicity



documented in this encounter



Insurance







 Payer  Benefit Plan /  Subscriber ID  Effective  Phone  Address  Type



   Group    [facility]  xxxxxxxxx  3/1/2019-Prese    [address]  Medicaid



 HEALTH CHOICE -  HEALTH CHOICE    nt    [id number]



  



 MANAGED MEDICAID [place]          [id number]  









 Guarantor Name  Account Type  Relation to  Date of Birth  Phone  Billing



     Patient      Address

 

 VicRitax  Personal/Family  Self  1995  [phone] [address]



         (Home)  [address]







           [place]



           [address]



documented as of this encounter



Advance Directives







 Name  Relationship  Healthcare Agent Relationship  Communication

 

 Lauren Ho  Mother  Primary healthcare agent  [phone]



       (Mobile)[phone] (Home)

## 2019-09-20 NOTE — XMS REPORT
Patient Summary Document

 Created on:2019



Patient:YULIANA ASHRAF

Sex:Female

:1995

External Reference #:[id number]





Demographics







 Address  [address] [address] [address]



   [place] [address]

 

 Home Phone  [phone]

 

 Preferred Language  Unknown

 

 Marital Status  Unknown

 

 Rastafari Affiliation  Unknown

 

 Race  Unknown

 

 Additional Race(s)  Unavailable

 

 Ethnic Group  Unknown









Author







 Organization  [facility]

 

 Address  [address] [place] [address] Dr. Mcgregor [address] [place] [address]

 

 Phone  [phone]









Care Team Providers







 Name  Role  Phone

 

 Unavailable  Unavailable  Unavailable









Problems

This patient has no known problems.



Allergies, Adverse Reactions, Alerts

This patient has no known allergies or adverse reactions.



Medications

This patient has no known medications.

## 2019-09-20 NOTE — XMS REPORT
Summary of Care

 Created on:2019



Patient:Mendy Ho

Sex:Female

:1995

External Reference #:[id number]





Demographics







 Address  [address]



   [place] [address]

 

 Mobile Phone  [phone]

 

 Home Phone  [phone]

 

 Phone  [phone]

 

 Email Address  [email or web address]

 

 Email Address  [email or web address]

 

 Preferred Language  English

 

 Marital Status  

 

 Oriental orthodox Affiliation  Unknown

 

 Race  White

 

 Ethnic Group   or 









Author







 Organization  [facility]

 

 Address  [address] [place] [id number]









Support







 Name  Relationship  Address  Phone

 

 Lauren Ho  Unavailable  [address]  [phone]



     [place] [address]  

 

 Lazeina Muñiz  Unavailable  Unknown  [phone]



     [place] [address]  









Care Team Providers







 Name  Role  Phone

 

 Pcp, Patient Does Not Have A  Unavailable  [phone]

 

 Melinda Limon  Primary Care Provider  [phone]









Reason for Visit







 Reason  Comments

 

 Prenatal Care  







Encounter Details







 Date  Type  Department  Care Team  Description

 

 2019  Routine Prenatal  [facility]-  Melinda Limon  High risk 
pregnancy, antepartum (Primary Dx);



   Visit  HECTOR Walls



  Previous  delivery affecting pregnancy, antepartum;



     [address]



  [address]



     [place]



  



     [address]



  [place]  



     [phone] [address] [phone]  



       (Fax)  







Allergies







 Active Allergy  Reactions  Severity  Noted Date  Comments

 

 Oseltamivir Phosphate  Rash  Medium  2019  



documented as of this encounter (statuses as of 2019)



Medications







 Medication  Sig  Dispensed  Refills  Start Date  End Date  Status

 

 prenatal vit  Take 1 Packet by  30 Each  6  2019    Active



 33-iron-folic-dha  mouth daily.          



 ([facility] + DHA) 29            



 mg iron-1 mg -250 mg            



 combo packIndications:            



 High risk pregnancy,            



 antepartum            



documented as of this encounter (statuses as of 2019)



Active Problems







 Problem  Noted Date

 

 Abnormal maternal glucose tolerance, antepartum  2019









 Overview: 







 3hr GTT ordered









 Cramps of lower extremity  2019

 

 Maternal varicella, non-immune  2019









 Overview: 







 Address in Postpartum









 High risk pregnancy, antepartum  2019

 

 Previous  delivery affecting pregnancy, antepartum  2019

 

 Multiparity  2019

 

 Back pain, unspecified back location, unspecified back pain laterality,  2019



 unspecified chronicity  









 Pregnant  Estimated Date of Delivery  Comments

 

 Yes  2019  Based on last menstrual period of 2018



     (Exact Date)



documented as of this encounter (statuses as of 2019)



Immunizations







 Name  Administration Dates  Next Due

 

 Tdap  2019  



documented as of this encounter



Social History







 Tobacco Use  Types  Packs/Day  Years Used  Date

 

 Never Smoker        









 Smokeless Tobacco: Never Used      









 Alcohol Use  Drinks/Week  oz/Week  Comments

 

 No      









 Pregnant  Estimated Date of Delivery  Comments

 

 Yes  2019  Based on last menstrual period of 2018



     (Exact Date)









 Sex Assigned at Birth  Date Recorded

 

 Not on file  









 Job Start Date  Occupation  Industry

 

 Not on file  Not on file  Not on file









 Travel History  Travel Start  Travel End









 No recent travel history available.



documented as of this encounter



Last Filed Vital Signs







 Vital Sign  Reading  Time Taken  Comments

 

 Blood Pressure  116/69  2019 12:52 PM CDT  

 

 Pulse  98  2019 12:52 PM CDT  

 

 Temperature  36.5 C (97.7 F)  2019 12:52 PM CDT  

 

 Respiratory Rate  16  2019 12:52 PM CDT  

 

 Oxygen Saturation  -  -  

 

 Inhaled Oxygen Concentration  -  -  

 

 Weight  91.4 kg (201 lb 8 oz)  2019 12:52 PM CDT  

 

 Height  162.6 cm (5' 4")  2019 12:52 PM CDT  

 

 Body Mass Index  34.59  2019 12:52 PM CDT  



documented in this encounter



Progress Notes

Melinda Limon, HECTOR - 2019 12:45 PM CDTFormatting of this note might 
be different from the original.

Chief complaint:

Chief Complaint

Patient presents with

 Prenatal Care



HPI CC: Follow Up Prenatal Visit



Mendy Ho is a 24 year old, , /White female. Patient's 
last menstrual period was 2018 (exact date).  She is 32w3d with an 
intrauterine pregnancy.  Her estimated date of delivery is 2019, by Last 
Menstrual Period.  She has no complaints today. She reports +FM and denies 
contractions, LOF and bleeding today. Patient denies current or past physical, 
sexual or emotional abuse.



Histories

OB History

 Para Term  AB Living

3 1 1   1 1

SAB TAB Ectopic Multiple Live Births

1       1



# Outcome Date GA Lbr Tom/2nd Weight Sex Delivery Anes PTL Lv

3 Current

2 SAB 18

1 Term 17 39w0d  8 lb 13 oz (3.997 kg) F  SEC   ARNULFO



OB History

 Para Term  AB Living

3 1 1   1 1

SAB TAB Ectopic Multiple Live Births

1       1



# Outcome Date GA Lbr Tom/2nd Weight Sex Delivery Anes PTL Lv

3 Current

2 SAB 18

1 Term 17 39w0d  8 lb 13 oz (3.997 kg) F  SEC   ARNULFO



Past Medical History:

Diagnosis Date

 Abnormal maternal glucose tolerance, antepartum 2019

 Anemia



Family History

Problem Relation Age of Onset

 Depression Mother

 Hypertension Mother

 Hypothyroidism Mother

 Diabetes Father

 High cholesterol Father

 Hypertension Father

 Pancreatic Cancer Maternal Grandmother

 Cancer Maternal Grandmother

 Lung Cancer Maternal Grandfather

 Cancer Maternal Grandfather

 Heart Paternal Grandfather

 Depression Brother



Family Status

Relation Name Status

 Mo  Alive

 Fa  Alive

 MGMo  

 MGFa  

 PGMo  Alive

 PGFa  Alive

 Bro  (Not Specified)



Past Surgical History:

Procedure Laterality Date

  SECTION



Social History



Socioeconomic History

 Marital status: 

  Spouse name: Not on file

 Number of children: Not on file

 Years of education: Not on file

 Highest education level: Not on file

Occupational History

 Not on file

Social Needs

 Financial resource strain: Not on file

 Food insecurity:

  Worry: Not on file

  Inability: Not on file

 Transportation needs:

  Medical: Not on file

  Non-medical: Not on file

Tobacco Use

 Smoking status: Never Smoker

 Smokeless tobacco: Never Used

Substance and Sexual Activity

 Alcohol use: No

 Drug use: No

 Sexual activity: Yes

  Partners: Male

  Birth control/protection: None

  Comment: last intercourse 19

Lifestyle

 Physical activity:

  Days per week: Not on file

  Minutes per session: Not on file

 Stress: Not on file

Relationships

 Social connections:

  Talks on phone: Not on file

  Gets together: Not on file

  Attends Synagogue service: Not on file

  Active member of club or organization: Not on file

  Attends meetings of clubs or organizations: Not on file

  Relationship status: Not on file

 Intimate partner violence:

  Fear of current or ex partner: Not on file

  Emotionally abused: Not on file

  Physically abused: Not on file

  Forced sexual activity: Not on file

Other Topics Concern

 Not on file

Social History Narrative

 Not on file



Social History



Substance and Sexual Activity

Sexual Activity Yes

 Partners: Male

 Birth control/protection: None

 Comment: last intercourse 19







Labs

No new labs



Radiology

No new radiology.



Allergies

Mendy is allergic to tamiflu [oseltamivir phosphate].



Medications

Mendy has a current medication list which includes the following prescription(
s): prenatal vit 33-iron-folic-dha.



Review of Systems

Eyes: Negative for visual disturbance.

Cardiovascular: Negative for leg swelling.

Gastrointestinal: Negative for abdominal pain, nausea and vomiting.

Genitourinary: Negative for vaginal bleeding, vaginal discharge and pelvic pain.

Neurological: Negative for headaches.



/69 (BP Location: Right arm, Patient Position: Sitting, BP CUFF SIZE: 
Adult Small)  | Pulse 98 | Temp 36.5 C (97.7 F) (Oral)  | Resp 16  | Ht 5' 4
" (1.626 m)  | Wt 201 lb 8 oz (91.4 kg)  | LMP 2018 (Exact Date)  | BMI 
34.59 kg/m

Pregravid BMI: 30.0



Physical Exam



PRENATAL PHYSICAL:

General Exam:

Neurological: Normal

Abdomen: Normal

Extremities: Normal



Pelvic Exam:

Uterus: 34cm  Weeks





Assessment/Plan

High risk pregnancy, antepartum  (primary encounter diagnosis)

Previous  delivery affecting pregnancy, antepartum

Multiparity

Comment: Routine Prenatal Visit

Plan: POCT URINALYSIS W SPECIFIC GRAVITY, POCT

      URINALYSIS W SPECIFIC GRAVITY

      Denies zika virus risk, signs and symptoms such as fever,rash,joint pain, 
conjunctivitis (red eyes), muscle pain, headaches; outside [place] travel to areas 
affected by zika, and FOB exposure to zika.Educated on use of mosquito 
repellent.



Return to clinic in 2 weeks.

Discussed treatment options.

Medications as ordered.

Reviewed patient instructions and provided printed copy.



This visit did not involve counseling and coordination that comprised more than 
50% of the visit time.

HECTOR Ulloa  2019  1:12 PM

Electronically signed by Melinda Limon FNP at 2019  1:12 PM 
CDTdocumented in this encounter



Plan of Treatment







 Date  Type  Specialty  Care Team  Description

 

 2019  Routine Prenatal Visit  [facility]  Melinda Limon, FNP



  



       [address] [place] [address]



  



       [phone] (Fax)  









 Name  Type  Priority  Associated Diagnoses  Order Schedule

 

 POCT URINALYSIS W  LAB  Routine  High risk pregnancy,  20 Occurrences starting



 SPECIFIC GRAVITY      antepartum  2019 until



         2020, 1 completed









 Health Maintenance  Due Date  Last Done  Comments

 

 MENINGOCOCCAL B VACCINES (1 of 2 -  2006    



 Risk Bexsero 2-dose series)      

 

 VARICELLA VACCINES (1 of 2 - 13+  2009    



 2-dose series)      

 

 HPV VACCINES (1 - Female 3-dose  2011    



 series)      

 

 INFLUENZA VACCINE  2019    

 

 CHLAMYDIA SCREENING  2020  

 

 PAP SMEAR  2022  

 

 DTaP,Tdap,and Td Vaccines (2 - Td)  2029  

 

 PNEUMOCOCCAL 0-64 YEARS COMBINED  Aged Out    No longer eligible based on



 SERIES      patient's age to complete



       this topic



documented as of this encounter



Procedures







 Procedure Name  Priority  Date/Time  Associated Diagnosis  Comments

 

 POCT URINALYSIS  Routine  2019 12:54 PM  High risk pregnancy,  Results 
for this



     CDT  antepartum  procedure are in



         the results



         section.



documented in this encounter



Results

POCT URINALYSIS W SPECIFIC GRAVITY (2019 12:54 PM CDT)





 Component  Value  Ref Range  Performed At  Pathologist Signature

 

 POCT U SP GRAV  .  1.005 - 1.025 mg/dl    

 

 POCT PH U  .  5 - 8 mg/dl    

 

 POCT U LEUK EST  .  Negative - Negative    

 

 POCT U NIT  .  Negative - Negative    

 

 POCT U PROT  neg  Negative - Negative    

 

 POCT U GLU  neg  Negative - Negative    

 

 POCT U KETONE  .  Negative - Negative    

 

 POCT U UROBILI  .  0.2 - 1 mg/dl    

 

 POCT U BILI  .  Negative - Negative    

 

 POCT U BLD  .  Negative - Negative    

 

 POCT U COLOR        

 

 POCT U APPEAR        









 Specimen

 

 Urine - URINE, CLEAN CATCH



documented in this encounter



Visit Diagnoses







 Diagnosis

 

 High risk pregnancy, antepartum - Primary

 

 Previous  delivery affecting pregnancy, antepartum







 Previous  delivery, antepartum condition or complication

 

 Multiparity



documented in this encounter



Insurance







 Payer  Benefit Plan /  Subscriber ID  Effective  Phone  Address  Type



   Group    Dates      

 

 [facility]  xxxxxxxxx  3/1/2019-Prese    [address]  Medicaid



 HEALTH CHOICE -  HEALTH CHOICE    nt    [id number]



  



 MANAGED  MEDICAID        [place]          [id number]  









 Guarantor Name  Account Type  Relation to  Date of Birth  Phone  Billing



     Patient      Address

 

 Rita Hox  Personal/Family  Self  1995  [phone] [address]



         (Home)  [address]







           [place]



           [address]



documented as of this encounter



Advance Directives







 Name  Relationship  Healthcare Agent Relationship  Communication

 

 Lauren Ho  Mother  Primary healthcare agent  [phone]



       (Mobile)[phone] (Home)

## 2019-09-20 NOTE — XMS REPORT
Summary of Care

 Created on:2019



Patient:Mendy Ho

Sex:Female

:1995

External Reference #:[id number]





Demographics







 Address  [address]



   [place] [address]

 

 Mobile Phone  [phone]

 

 Home Phone  [phone]

 

 Phone  [phone]

 

 Email Address  [email or web address]

 

 Email Address  [email or web address]

 

 Preferred Language  English

 

 Marital Status  

 

 Scientology Affiliation  Unknown

 

 Race  White

 

 Ethnic Group   or 









Author







 Organization  [facility]

 

 Address  [address] [place] [address]









Support







 Name  Relationship  Address  Phone

 

 Lauren Ho  Unavailable  [address]  [phone]



     [place] [address]  

 

 La Muñiz  Unavailable  Unknown  [phone]



     [place] [address]  









Care Team Providers







 Name  Role  Phone

 

 Pcp, Patient Does Not Have A  Unavailable  [phone]

 

 Melinda Limon  Primary Care Provider  [phone]









Reason for Visit







 Reason  Comments

 

 Prenatal Care  







Encounter Details







 Date  Type  Department  Care Team  Description

 

 2019  Routine Prenatal  [facility]-  Melinda Limon  High risk 
pregnancy, antepartum (Primary Dx);



   Visit  HECTOR Walls



  Previous  delivery affecting pregnancy, antepartum;



     [address]



  [address]  Multiparity;



     [place]  [address]



  Desires  (vaginal birth after ) trial;



     [address]



  [place]  Obesity in pregnancy



     [phone]  [address]



  



       [phone]  



       (Fax)  







Allergies







 Active Allergy  Reactions  Severity  Noted Date  Comments

 

 Oseltamivir Phosphate  Rash  Medium  2019  



documented as of this encounter (statuses as of 2019)



Medications







 Medication  Sig  Dispensed  Refills  Start Date  End Date  Status

 

 prenatal vit  Take 1 Packet by  30 Each  6  2019    Active



 33-iron-folic-dha  mouth daily.          



 (SELECT-OB + DHA) 29            



 mg iron-1 mg -250 mg            



 combo packIndications:            



 High risk pregnancy,            



 antepartum            



documented as of this encounter (statuses as of 2019)



Active Problems







 Problem  Noted Date

 

 Desires  (vaginal birth after ) trial  2019

 

 Abnormal maternal glucose tolerance, antepartum  2019









 Overview: 







 3hr GTT ordered









 Cramps of lower extremity  2019

 

 Maternal varicella, non-immune  2019









 Overview: 







 Address in Postpartum









 High risk pregnancy, antepartum  2019

 

 Previous  delivery affecting pregnancy, antepartum  2019

 

 Multiparity  2019

 

 Back pain, unspecified back location, unspecified back pain laterality,  2019



 unspecified chronicity  









 Pregnant  Estimated Date of Delivery  Comments

 

 Yes  2019  Based on last menstrual period of 2018



     (Exact Date)



documented as of this encounter (statuses as of 2019)



Immunizations







 Name  Administration Dates  Next Due

 

 Tdap  2019  



documented as of this encounter



Social History







 Tobacco Use  Types  Packs/Day  Years Used  Date

 

 Never Smoker        









 Smokeless Tobacco: Never Used      









 Alcohol Use  Drinks/Week  oz/Week  Comments

 

 No      









 Pregnant  Estimated Date of Delivery  Comments

 

 Yes  2019  Based on last menstrual period of 2018



     (Exact Date)









 Sex Assigned at Birth  Date Recorded

 

 Not on file  









 Job Start Date  Occupation  Industry

 

 Not on file  Not on file  Not on file









 Travel History  Travel Start  Travel End









 No recent travel history available.



documented as of this encounter



Last Filed Vital Signs







 Vital Sign  Reading  Time Taken  Comments

 

 Blood Pressure  105/66  2019  2:39 PM CDT  

 

 Pulse  88  2019  2:39 PM CDT  

 

 Temperature  36.6 C (97.8 F)  2019  2:39 PM CDT  

 

 Respiratory Rate  16  2019  2:39 PM CDT  

 

 Oxygen Saturation  -  -  

 

 Inhaled Oxygen Concentration  -  -  

 

 Weight  94.3 kg (208 lb)  2019  2:39 PM CDT  

 

 Height  162.6 cm (5' 4")  2019  2:39 PM CDT  

 

 Body Mass Index  35.7  2019  2:39 PM CDT  



documented in this encounter



Progress Notes

Melinda Limon FNP - 2019  3:30 PM CDTFormatting of this note might 
be different from the original.

Chief complaint:

Chief Complaint

Patient presents with

 Prenatal Care



HPI CC: Follow Up Prenatal Visit



Mendy Ho is a 24 year old, , /White female. Patient's 
last menstrual period was 2018 (exact date).  She is 36w4d with an 
intrauterine pregnancy.  Her estimated date of delivery is 2019, by Last 
Menstrual Period.  She has no complaints today. She reports +FM and denies 
contractions, LOF and bleeding today. Reviewed OB/ER, PIH, labor and movement 
precautions. [facility] signs and symptoms  of 
labor (regular contractions, LOF, vaginal bleeding or decrease fetal movement. 
Patient denies current or past physical, sexual or emotional abuse.



Histories

OB History

 Para Term  AB Living

3 1 1   1 1

SAB TAB Ectopic Multiple Live Births

1       1



# Outcome Date GA Lbr Tom/2nd Weight Sex Delivery Anes PTL Lv

3 Current

2 SAB 18

1 Term 17 39w0d  8 lb 13 oz (3.997 kg) F  SEC   ARNULFO



Past Medical History:

Diagnosis Date

 Abnormal maternal glucose tolerance, antepartum 2019

 Anemia



Family History

Problem Relation Age of Onset

 Depression Mother

 Hypertension Mother

 Hypothyroidism Mother

 Diabetes Father

 High cholesterol Father

 Hypertension Father

 Pancreatic Cancer Maternal Grandmother

 Cancer Maternal Grandmother

 Lung Cancer Maternal Grandfather

 Cancer Maternal Grandfather

 Heart Paternal Grandfather

 Depression Brother



Family Status

Relation Name Status

 Mo  Alive

 Fa  Alive

 MGMo  

 MGFa  

 PGMo  Alive

 PGFa  Alive

 Bro  (Not Specified)



Past Surgical History:

Procedure Laterality Date

  SECTION



Social History



Socioeconomic History

 Marital status: 

  Spouse name: Not on file

 Number of children: Not on file

 Years of education: Not on file

 Highest education level: Not on file

Occupational History

 Not on file

Social Needs

 Financial resource strain: Not on file

 Food insecurity:

  Worry: Not on file

  Inability: Not on file

 Transportation needs:

  Medical: Not on file

  Non-medical: Not on file

Tobacco Use

 Smoking status: Never Smoker

 Smokeless tobacco: Never Used

Substance and Sexual Activity

 Alcohol use: No

 Drug use: No

 Sexual activity: Yes

  Partners: Male

  Birth control/protection: None

  Comment: last intercourse 19

Lifestyle

 Physical activity:

  Days per week: Not on file

  Minutes per session: Not on file

 Stress: Not on file

Relationships

 Social connections:

  Talks on phone: Not on file

  Gets together: Not on file

  Attends Mormonism service: Not on file

  Active member of club or organization: Not on file

  Attends meetings of clubs or organizations: Not on file

  Relationship status: Not on file

 Intimate partner violence:

  Fear of current or ex partner: Not on file

  Emotionally abused: Not on file

  Physically abused: Not on file

  Forced sexual activity: Not on file

Other Topics Concern

 Not on file

Social History Narrative

 Not on file



Social History



Substance and Sexual Activity

Sexual Activity Yes

 Partners: Male

 Birth control/protection: None

 Comment: last intercourse 19







Labs

Labs are pending.



Radiology

No new radiology.



Allergies

Mendy is allergic to tamiflu [oseltamivir phosphate].



Medications

Mendy has a current medication list which includes the following prescription(
s): prenatal vit 33-iron-folic-dha.



Review of Systems

Eyes: Negative for visual disturbance.

Cardiovascular: Negative for leg swelling.

Gastrointestinal: Negative for abdominal pain, nausea and vomiting.

Genitourinary: Negative for vaginal bleeding, vaginal discharge and pelvic pain.

Neurological: Negative for headaches.



/66  | Pulse 88  | Temp 36.6 C (97.8 F)  | Resp 16  | Ht 5' 4" (1.626 
m)  | Wt 208 lb (94.3 kg)  | LMP 2018 (Exact Date)  | BMI 35.70 kg/m

Pregravid BMI: 30.0



Physical Exam



PRENATAL PHYSICAL:

General Exam:

Neurological: Normal

Abdomen: Normal

Extremities: Normal



Pelvic Exam:

Vagina:

    Chaperone present for the exam: HECTOR [employer] student

Cervix:

    Closed/50/-3

Membrane status: Intact

Uterus: 35cm  Weeks





Assessment/Plan

High risk pregnancy, antepartum  (primary encounter diagnosis)

Previous  delivery affecting pregnancy, antepartum

Multiparity

Desires  (vaginal birth after ) trial

Comment: Routine Prenatal Visit

Plan: CBC WITH DIFF, GC &amp; CHLAMYDIA AMPLIFIED ASSAY,

      GROUP B STREPTOCOCCUS BY PCR, CBC WITH

      DIFFERENTIAL, POCT URINALYSIS W SPECIFIC

      GRAVITY

      Denies zika virus risk, signs and symptoms such as fever,rash,joint pain, 
conjunctivitis (red eyes), muscle pain, headaches; outside [place] travel to areas 
affected by zika, and FOB exposure to zika.Educated on use of mosquito 
repellent.



Obesity in pregnancy

Comment: See BMI

Plan: Patient encouraged to limit weight gain and sensible diet.



Return to clinic in 1 weeks.

Discussed treatment options.

Medications as ordered.

Reviewed patient instructions and provided printed copy.



This visit did not involve counseling and coordination that comprised more than 
50% of the visit time.

HECTOR Ulloa  2019  2:56 PM

Electronically signed by Melinda Limon FNP at 2019  3:00 PM 
CDTdocumented in this encounter



Plan of Treatment







 Name  Type  Priority  Associated Diagnoses  Date/Time

 

 CBC WITH DIFF  LAB  Routine  High risk pregnancy,  2019  2:39 PM



       antepartum  CDT

 

 GC & CHLAMYDIA AMPLIFIED  LAB  Routine  High risk pregnancy,  2019  2:39 
PM



 ASSAY      antepartum  CDT

 

 GROUP B STREPTOCOCCUS BY  LAB  Routine  High risk pregnancy,  2019  2:39 
PM



 PCR      antepartum  CDT

 

 CBC WITH DIFFERENTIAL  LAB  Routine  High risk pregnancy,  2019  2:39 PM



       antepartum  CDT









 Health Maintenance  Due Date  Last Done  Comments

 

 MENINGOCOCCAL B VACCINES (1 of 2 -  2006    



 Risk Bexsero 2-dose series)      

 

 VARICELLA VACCINES (1 of 2 - 13+  2009    



 2-dose series)      

 

 HPV VACCINES (1 - Female 3-dose  2011    



 series)      

 

 INFLUENZA VACCINE (#1)  2019    

 

 CHLAMYDIA SCREENING  2020  

 

 PAP SMEAR  2022  

 

 DTaP,Tdap,and Td Vaccines (2 - Td)  2029  

 

 PNEUMOCOCCAL 0-64 YEARS COMBINED  Aged Out    No longer eligible based on



 SERIES      patient's age to complete



       this topic



documented as of this encounter



Procedures







 Procedure Name  Priority  Date/Time  Associated Diagnosis  Comments

 

 POCT URINALYSIS  Routine  2019  2:42 PM  High risk pregnancy,  Results 
for this



     CDT  antepartum  procedure are in



         the results



         section.



documented in this encounter



Results

POCT URINALYSIS W SPECIFIC GRAVITY (2019  2:42 PM CDT)





 Component  Value  Ref Range  Performed At  Pathologist Signature

 

 POCT U SP GRAV  .  1.005 - 1.025 mg/dl    

 

 POCT PH U  .  5 - 8 mg/dl    

 

 POCT U LEUK EST  .  Negative - Negative    

 

 POCT U NIT  .  Negative - Negative    

 

 POCT U PROT  neg  Negative - Negative    

 

 POCT U GLU  neg  Negative - Negative    

 

 POCT U KETONE  .  Negative - Negative    

 

 POCT U UROBILI  .  0.2 - 1 mg/dl    

 

 POCT U BILI  .  Negative - Negative    

 

 POCT U BLD  .  Negative - Negative    

 

 POCT U COLOR        

 

 POCT U APPEAR        









 Specimen

 

 Urine - URINE, CLEAN CATCH



documented in this encounter



Visit Diagnoses







 Diagnosis

 

 High risk pregnancy, antepartum - Primary

 

 Previous  delivery affecting pregnancy, antepartum







 Previous  delivery, antepartum condition or complication

 

 Multiparity

 

 Desires  (vaginal birth after ) trial







 Previous  delivery, unspecified as to episode of care or not applicable

 

 Obesity in pregnancy







 Obesity complicating pregnancy, childbirth, or the puerperium, unspecified as 
to



 episode of care or not applicable



documented in this encounter



Insurance







 Payer  Benefit Plan /  Subscriber ID  Effective  Phone  Address  Type



   Group    [facility]  xxxxxxxxx  3/1/2019-Prese    [address]  Medicaid



 HEALTH CHOICE -  HEALTH CHOICE    nt    [id number]



  



 MANAGED MEDICAID [place]          [id number]  









 Guarantor Name  Account Type  Relation to  Date of Birth  Phone  Billing



     Patient      Address

 

 Menyd Ho  Personal/Family  Self  1995  [phone] [address]



         (Home)  [address]







           [place]



           [address]



documented as of this encounter



Advance Directives







 Name  Relationship  Healthcare Agent Relationship  Communication

 

 Lauren Ho  Mother  Primary healthcare agent  [phone]



       (Mobile)[phone] (Home)

## 2019-09-20 NOTE — XMS REPORT
Summary of Care

 Created on:2019



Patient:Mendy Ho

Sex:Female

:1995

External Reference #:[id number]





Demographics







 Address  [address]



   [place] [address]

 

 Mobile Phone  [phone]

 

 Home Phone  [phone]

 

 Phone  [phone]

 

 Email Address  [email or web address]

 

 Email Address  [email or web address]

 

 Preferred Language  English

 

 Marital Status  

 

 Baptism Affiliation  Unknown

 

 Race  White

 

 Ethnic Group   or 









Author







 Organization  [facility]

 

 Address  [address] [place] [address]









Support







 Name  Relationship  Address  Phone

 

 Laurne Ho  Unavailable  [address]  [phone]



     [place] [address]  

 

 La Muñiz  Unavailable  Unknown  [phone]



     [place] [address]  









Care Team Providers







 Name  Role  Phone

 

 Pcp, Patient Does Not Have A  Unavailable  [phone]

 

 Melinda Limon [facility]  Primary Care Provider  [phone]









Reason for Visit







 Reason  Comments

 

 Abnormal Lab  Anemia







Encounter Details







 Date  Type  Department  Care Team  Description

 

 2019  Telephone  [facility]-  Melinda Limon,  Abnormal Lab (
Anemia)



     [facility]



  



     [address] [place] [address]



  [address] [place] [address]



  



     [address] [phone] (Fax)  







Allergies







 Active Allergy  Reactions  Severity  Noted Date  Comments

 

 Oseltamivir Phosphate  Rash  Medium  2019  



documented as of this encounter (statuses as of 2019)



Medications







 Medication  Sig  Dispensed  Refills  Start Date  End Date  Status

 

 prenatal vit  Take 1 Packet by  30 Each  6  2019    Active



 33-iron-folic-dha  mouth daily.          



 (SELECT-OB + DHA) 29            



 mg iron-1 mg -250 mg            



 combo packIndications:            



 High risk pregnancy,            



 antepartum            

 

 ferrous sulfate 325 mg  Take 1 tablet by  60 tablet  3  2019    Active



 (65 mg iron)  mouth 2 (two)          



 tabletIndications:  times daily.          



 Anemia of mother in            



 pregnancy, antepartum            

 

 ascorbic acid, vitamin  Take 1 tablet by  90 tablet  3  2019    Active



 C, 500 mg  mouth 3 (three)          



 tabletIndications:  times daily.          



 Anemia of mother in            



 pregnancy, antepartum            



documented as of this encounter (statuses as of 2019)



Active Problems







 Problem  Noted Date

 

 Desires  (vaginal birth after ) trial  2019

 

 Abnormal maternal glucose tolerance, antepartum  2019









 Overview: 







 3hr GTT ordered









 Cramps of lower extremity  2019

 

 Maternal varicella, non-immune  2019









 Overview: 







 Address in Postpartum









 High risk pregnancy, antepartum  2019

 

 Previous  delivery affecting pregnancy, antepartum  2019

 

 Multiparity  2019

 

 Back pain, unspecified back location, unspecified back pain laterality,  2019



 unspecified chronicity  









 Pregnant  Estimated Date of Delivery  Comments

 

 Yes  2019  Based on last menstrual period of 2018



     (Exact Date)



documented as of this encounter (statuses as of 2019)



Immunizations







 Name  Administration Dates  Next Due

 

 Tdap  2019  



documented as of this encounter



Social History







 Tobacco Use  Types  Packs/Day  Years Used  Date

 

 Never Smoker        









 Smokeless Tobacco: Never Used      









 Alcohol Use  Drinks/Week  oz/Week  Comments

 

 No      









 Pregnant  Estimated Date of Delivery  Comments

 

 Yes  2019  Based on last menstrual period of 2018



     (Exact Date)









 Sex Assigned at Birth  Date Recorded

 

 Not on file  









 Job Start Date  Occupation  Industry

 

 Not on file  Not on file  Not on file









 Travel History  Travel Start  Travel End









 No recent travel history available.



documented as of this encounter



Last Filed Vital Signs

Not on filedocumented in this encounter



Plan of Treatment







 Date  Type  Specialty  Care Team  Description

 

 2019  Routine Prenatal Visit  [facility]ell[facility]  Melinda Limno, FNP



  



       [address] [place] [address]



  



       [phone] (Fax)  









 Health Maintenance  Due Date  Last Done  Comments

 

 MENINGOCOCCAL B VACCINES (1 of 2 -  2006    



 Risk Bexsero 2-dose series)      

 

 VARICELLA VACCINES (1 of 2 - 13+  2009    



 2-dose series)      

 

 HPV VACCINES (1 - Female 3-dose  2011    



 series)      

 

 INFLUENZA VACCINE (#1)  2019    

 

 CHLAMYDIA SCREENING  2020  

 

 PAP SMEAR  2022  

 

 DTaP,Tdap,and Td Vaccines (2 - Td)  2029  

 

 PNEUMOCOCCAL 0-64 YEARS COMBINED  Aged Out    No longer eligible based on



 SERIES      patient's age to complete



       this topic



documented as of this encounter



Results

Not on filedocumented in this encounter



Visit Diagnoses







 Diagnosis

 

 Anemia of mother in pregnancy, antepartum - Primary







 Anemia, antepartum



documented in this encounter



Insurance







 Payer  Benefit Plan /  Subscriber ID  Effective  Phone  Address  Type



   Group    [facility]  xxxxxxxxx  3/1/2019-Prese    [address]  Medicaid



 HEALTH CHOICE -  HEALTH [place]...Exactly    nt    [id number]



  



 [facility]  MEDICAID        [place]          [id number]  



documented as of this encounter



Advance Directives







 Name  Relationship  Healthcare Agent Relationship  Communication

 

 Lauren Ho  Mother  Primary healthcare agent  [phone]



       (Mobile)[phone] (Home)

## 2019-09-20 NOTE — ER
Nurse's Notes                                                                                     

 [facility]                                                                 

Name: Mendy Ho                                                                              

Age: 24 yrs                                                                                       

Sex: Female                                                                                       

: 1995                                                                                   

MRN: [id number]                                                                                   

Arrival Date: 2019                                                                          

Time: 10:05                                                                                       

Account#: [id number]                                                                            

Bed 17                                                                                            

Private MD:                                                                                       

Diagnosis: Anemia complicating childbirth;Intra-abdominal and pelvic swelling, mass and lump,     

  unspecified site                                                                                

                                                                                                  

Presentation:                                                                                     

                                                                                             

10:23 Presenting complaint: Patient states: "My  opened and its been bleeding"       aj1 

      Reports that she noticed it when she woke up this morning. Sanguinous drainage noted        

      from  incision. Transition of care: patient was not received from another          

      setting of care. Onset of symptoms was 2019. Risk Assessment: Do you want     

      to hurt yourself or someone else? Patient reports no desire to harm self or others.         

      Initial Sepsis Screen: Does the patient meet any 2 criteria? HR > 90 bpm. No. Patient's     

      initial sepsis screen is negative. Does the patient have a suspected source of              

      infection? No. Patient's initial sepsis screen is negative. Care prior to arrival: None.    

10:23 Method Of Arrival: Wheelchair                                                           aj1 

10:23 Acuity: JACQUELINE 3                                                                           aj1 

10:29 Note Applied 4x4s to incision site, patient was instructed to notify staff if drsg      aj1 

      becomes saturated, or if she begins feeling worse.                                          

                                                                                                  

Triage Assessment:                                                                                

10:27 General: Appears in no apparent distress. uncomfortable, Behavior is calm, cooperative, aj1 

      appropriate for age. Pain: Complains of pain in suprapubic area Pain currently is 8 out     

      of 10 on a pain scale. Neuro: Level of Consciousness is awake, alert, obeys commands.       

      Cardiovascular: Patient's skin is warm and dry. Respiratory: Airway is patent               

      Respiratory effort is even, unlabored, Respiratory pattern is regular, symmetrical.         

                                                                                                  

OB/GYN:                                                                                           

10:27 LMP N/A - Recent birth                                                                  aj1 

                                                                                                  

Historical:                                                                                       

- Allergies:                                                                                      

10:27 Tamiflu;                                                                                aj1 

- PMHx:                                                                                           

10:27 None;                                                                                   aj1 

- PSHx:                                                                                           

10:27 ;                                                                              aj1 

                                                                                                  

- Ebola Screening: : Patient denies travel to an Ebola-affected area in the 21 days               

  before illness onset.                                                                           

                                                                                                  

                                                                                                  

Screenin:15 Abuse screen: Denies threats or abuse. Nutritional screening: No deficits noted.        aa5 

      Tuberculosis screening: No symptoms or risk factors identified. Fall Risk None              

      identified.                                                                                 

                                                                                                  

Assessment:                                                                                       

11:15 General: Appears comfortable, Behavior is calm, cooperative. Pain: Complains of pain in aa5 

       incision Pain does not radiate. Pain currently is 7 out of 10 on a pain           

      scale. Quality of pain is described as sharp, tender, Pain began 19 Is continuous,     

      Aggravated by increased activity. Neuro: Level of Consciousness is awake, alert, obeys      

      commands, Oriented to person, place, time, situation. Cardiovascular: Heart tones S1 S2     

      present Rhythm is regular. Respiratory: Airway is patent Respiratory effort is even,        

      unlabored, Respiratory pattern is regular, symmetrical. GI: Abdomen is round                

      non-distended, Bowel sounds present X 4 quads. Abd is soft and non tender X 4 quads.        

      Patient currently denies nausea, vomiting. : No signs and/or symptoms were reported       

      regarding the genitourinary system. EENT: No signs and/or symptoms were reported            

      regarding the EENT system. Derm: Skin is pink, warm \T\ dry.  incision noted to    

      be closed, mild serosanguineous drainage noted, steri strips in place. No s/s of            

      infection noted. Musculoskeletal: Range of motion: intact in all extremities.               

12:30 Reassessment: Pt back from US, pt states "it started bleeding again after the           aa5 

      ultrasound". Gauze applied to site, oozing of blood noted at this time. .                   

13:00 Reassessment: Patient is alert, oriented x 3, equal unlabored respirations, skin        aa5 

      warm/dry/pink.  incision reinforced via steri strips and Mastisol adhesive by      

      PA, pt tolerated well. No active bleeding noted at this time. Gauze applied to site per     

      PA.  edges are well approximated, no obvious opening noted to site. .              

14:07 Reassessment: Patient is alert, oriented x 3, equal unlabored respirations, skin        aa5 

      warm/dry/pink.                                                                              

14:07 Reassessment: No active bleeding noted to  site. .                             aa5 

15:30 Reassessment: Patient is alert, oriented x 3, equal unlabored respirations, skin        aa5 

      warm/dry/pink.                                                                              

16:15 Reassessment: Patient is alert, oriented x 3, equal unlabored respirations, skin        aa5 

      warm/dry/pink.                                                                              

16:15 Reassessment: No active bleeding noted to  site. .                             aa5 

                                                                                                  

Vital Signs:                                                                                      

10:27  / 75; Pulse 104; Resp 18; Temp 97.5; Pulse Ox 100% on R/A; Weight 90.72 kg (R);  aj1 

      Height 5 ft. 4 in. (162.56 cm) (R); Pain 8/10;                                              

13:40  / 74 Supine; Pulse 73; Resp 16 S; Temp 98.9(O); Pulse Ox 97% on R/A;             aa5 

13:42  / 63 Sitting; Pulse 78;                                                          aa5 

13:44  / 68 Standing; Pulse 102;                                                        aa5 

15:30  / 64; Pulse 80; Resp 18 S; Temp 98.5(O); Pulse Ox 99% on R/A;                    aa5 

16:12  / 65; Pulse 78; Resp 16 S; Temp 98.5(O); Pulse Ox 99% on R/A;                    aa5 

10:27 Body Mass Index 34.33 (90.72 kg, 162.56 cm)                                             aj1 

13:44 Pt c/o feeling lightheaded when standing                                                aa5 

                                                                                                  

ED Course:                                                                                        

10:05 Patient arrived in ED.                                                                  as  

10:27 Triage completed.                                                                       aj1 

10:27 Arm band placed on Patient placed in waiting room, Patient notified of wait time.       aj1 

11:13 Laurel Ta, RN is Primary Nurse.                                                   aa5 

11:15 Patient has correct armband on for positive identification. Placed in gown. Bed in low  aa5 

      position. Call light in reach. Side rails up X2. Adult w/ patient.                          

11:17 Dick Phipps PA is PHCP.                                                                cp  

11:17 Hussein Buckner MD is Attending Physician.                                              cp  

12:31 US Abdomen Limited: along  incision In Process Unspecified.                    EDMS

14:00 Initial lab(s) drawn, by me, sent to lab. Inserted saline lock: 22 gauge in right hand, aa5 

      using aseptic technique. Blood collected.                                                   

16:15 No provider procedures requiring assistance completed. IV discontinued, intact,         aa5 

      bleeding controlled, No redness/swelling at site. Pressure dressing applied.                

                                                                                                  

Administered Medications:                                                                         

14:07 Drug: NS 0.9% 1000 ml Route: IV; Rate: 1 bolus; Site: right hand;                       aa5 

15:30 Follow up: IV Status: Completed infusion; IV Intake: 1000ml                             aa5 

16:13 Drug: Rocephin - (cefTRIAXone) 1 grams {Note: administered slow IVP per pharmacy        aa5 

      instruction at this time. .} Route: IVPB; Infused Over: 30 mins; Site: right hand;          

16:15 Follow up: Response: No adverse reaction                                                aa5 

                                                                                                  

                                                                                                  

Intake:                                                                                           

15:30 IV: 1000ml; Total: 1000ml.                                                              aa5 

                                                                                                  

Outcome:                                                                                          

15:19 Discharge ordered by MD.                                                                cp  

16:15 Discharged to home ambulatory, with family, with significant other.                     aa5 

16:15 Condition: stable                                                                           

16:15 Discharge instructions given to patient, Instructed on discharge instructions, follow       

      up and referral plans. medication usage, Demonstrated understanding of instructions,        

      follow-up care, medications, Prescriptions given X 1.                                       

16:19 Patient left the ED.                                                                    aa5 

                                                                                                  

Signatures:                                                                                       

Dispatcher [employer]                           EDMS                                                 

Delphine Slois RN RN   aj1                                                  

Sameera Catalan Audri, RN                     RN   aa5                                                  

Dick Phipps PA PA   cp                                                   

                                                                                                  

Corrections: (The following items were deleted from the chart)                                    

20:09 20:08 IV discontinued, intact, bleeding controlled, No redness/swelling at site.        aa5 

      Pressure dressing applied, aa5                                                              

20:09 20:08 No provider procedures requiring assistance completed. aa5                        aa5 

20:18 13:00 Reassessment: Patient is alert, oriented x 3, equal unlabored respirations, skin  aa5 

      warm/dry/pink.  incision reinforced via steri strips and Mastisol adhesive by      

      PA, pt tolerated well. No active bleeding noted at this time. Gauze applied to site per     

      PA. . aa5                                                                                   

20:20 15:30  / 64; Pulse 80bpm; Resp 18bpm; Spontaneous; Pulse Ox 99% RA; aa5           aa5 

20:20 16:12  / 65; Pulse 78bpm; Resp 16bpm; Spontaneous; Pulse Ox 99% RA; aa5           aa5 

                                                                                                  

**************************************************************************************************

## 2019-09-20 NOTE — XMS REPORT
Summary of Care

 Created on:2019



Patient:Mendy Ho

Sex:Female

:1995

External Reference #:[id number]





Demographics







 Address  [address]



   [place] [address]

 

 Mobile Phone  [phone]

 

 Home Phone  [phone]

 

 Phone  [phone]

 

 Email Address  [email or web address]

 

 Email Address  [email or web address]

 

 Preferred Language  English

 

 Marital Status  

 

 Holiness Affiliation  Unknown

 

 Race  White

 

 Ethnic Group   or 









Author







 Organization  [facility]

 

 Address  [address] [place] [address]









Support







 Name  Relationship  Address  Phone

 

 Lauren Ho  Unavailable  [address]  [phone]



     [place] [address]  

 

 [place]iz  Unavailable  Unknown  [phone]



     [place] [address]  









Care Team Providers







 Name  Role  Phone

 

 Pcp, Patient Does Not Have A  Unavailable  [phone]

 

 Melinda Limon  Primary Care Provider  [phone]









Reason for Visit







 Reason  Comments

 

 Prenatal Care  







Encounter Details







 Date  Type  Department  Care Team  Description

 

 2019  Routine Prenatal  [facility]-  Melinda Limon  High risk 
pregnancy, antepartum (Primary Dx);



   Visit  HECTOR Walls



  Previous  delivery affecting pregnancy, antepartum;



     [address]



  [address]  Multiparity;



     [place], [place]  [place]



  Desires  (vaginal birth after ) trial;



     [address]



  [place]  Obesity in pregnancy



     [phone]  [address]



  



       [phone]  



       (Fax)  







Allergies







 Active Allergy  Reactions  Severity  Noted Date  Comments

 

 Oseltamivir Phosphate  Rash  Medium  2019  



documented as of this encounter (statuses as of 2019)



Medications







 Medication  Sig  Dispensed  Refills  Start Date  End Date  Status

 

 prenatal vit  Take 1 Packet by  30 Each  6  2019    Active



 33-iron-folic-dha  mouth daily.          



 (SELECT-OB + DHA) 29            



 mg iron-1 mg -250 mg            



 combo packIndications:            



 High risk pregnancy,            



 antepartum            



documented as of this encounter (statuses as of 2019)



Active Problems







 Problem  Noted Date

 

 Desires  (vaginal birth after ) trial  2019

 

 Abnormal maternal glucose tolerance, antepartum  2019









 Overview: 







 3hr GTT ordered









 Cramps of lower extremity  2019

 

 Maternal varicella, non-immune  2019









 Overview: 







 Address in Postpartum









 High risk pregnancy, antepartum  2019

 

 Previous  delivery affecting pregnancy, antepartum  2019

 

 Multiparity  2019

 

 Back pain, unspecified back location, unspecified back pain laterality,  2019



 unspecified chronicity  









 Pregnant  Estimated Date of Delivery  Comments

 

 Yes  2019  Based on last menstrual period of 2018



     (Exact Date)



documented as of this encounter (statuses as of 2019)



Immunizations







 Name  Administration Dates  Next Due

 

 Tdap  2019  



documented as of this encounter



Social History







 Tobacco Use  Types  Packs/Day  Years Used  Date

 

 Never Smoker        









 Smokeless Tobacco: Never Used      









 Alcohol Use  Drinks/Week  oz/Week  Comments

 

 No      









 Pregnant  Estimated Date of Delivery  Comments

 

 Yes  2019  Based on last menstrual period of 2018



     (Exact Date)









 Sex Assigned at Birth  Date Recorded

 

 Not on file  









 Job Start Date  Occupation  Industry

 

 Not on file  Not on file  Not on file









 Travel History  Travel Start  Travel End









 No recent travel history available.



documented as of this encounter



Last Filed Vital Signs







 Vital Sign  Reading  Time Taken  Comments

 

 Blood Pressure  105/66  2019  2:39 PM CDT  

 

 Pulse  88  2019  2:39 PM CDT  

 

 Temperature  36.6 C (97.8 F)  2019  2:39 PM CDT  

 

 Respiratory Rate  16  2019  2:39 PM CDT  

 

 Oxygen Saturation  -  -  

 

 Inhaled Oxygen Concentration  -  -  

 

 Weight  94.3 kg (208 lb)  2019  2:39 PM CDT  

 

 Height  162.6 cm (5' 4")  2019  2:39 PM CDT  

 

 Body Mass Index  35.7  2019  2:39 PM CDT  



documented in this encounter



Progress Notes

Melinda Limon FNP - 2019  3:30 PM CDTFormatting of this note might 
be different from the original.

Chief complaint:

Chief Complaint

Patient presents with

 Prenatal Care



HPI CC: Follow Up Prenatal Visit



Mendy Ho is a 24 year old, , /White female. Patient's 
last menstrual period was 2018 (exact date).  She is 36w4d with an 
intrauterine pregnancy.  Her estimated date of delivery is 2019, by Last 
Menstrual Period.  She has no complaints today. She reports +FM and denies 
contractions, LOF and bleeding today. Reviewed OB/ER, PIH, labor and movement 
precautions. Encouraged[facility] to [facility] to [facility] any signs and symptoms  of 
labor (regular contractions, LOF, vaginal bleeding or decrease fetal movement. 
Patient denies current or past physical, sexual or emotional abuse.



Histories

OB History

 Para Term  AB Living

3 1 1   1 1

SAB TAB Ectopic Multiple Live Births

1       1



# Outcome Date GA Lbr Tom/2nd Weight Sex Delivery Anes PTL Lv

3 Current

2 SAB 18

1 Term 17 39w0d  8 lb 13 oz (3.997 kg) F  SEC   ARNULFO



Past Medical History:

Diagnosis Date

 Abnormal maternal glucose tolerance, antepartum 2019

 Anemia



Family History

Problem Relation Age of Onset

 Depression Mother

 Hypertension Mother

 Hypothyroidism Mother

 Diabetes Father

 High cholesterol Father

 Hypertension Father

 Pancreatic Cancer Maternal Grandmother

 Cancer Maternal Grandmother

 Lung Cancer Maternal Grandfather

 Cancer Maternal Grandfather

 Heart Paternal Grandfather

 Depression Brother



Family Status

Relation Name Status

 Mo  Alive

 Fa  Alive

 MGMo  

 MGFa  

 PGMo  Alive

 PGFa  Alive

 Bro  (Not Specified)



Past Surgical History:

Procedure Laterality Date

  SECTION



Social History



Socioeconomic History

 Marital status: 

  Spouse name: Not on file

 Number of children: Not on file

 Years of education: Not on file

 Highest education level: Not on file

Occupational History

 Not on file

Social Needs

 Financial resource strain: Not on file

 Food insecurity:

  Worry: Not on file

  Inability: Not on file

 Transportation needs:

  Medical: Not on file

  Non-medical: Not on file

Tobacco Use

 Smoking status: Never Smoker

 Smokeless tobacco: Never Used

Substance and Sexual Activity

 Alcohol use: No

 Drug use: No

 Sexual activity: Yes

  Partners: Male

  Birth control/protection: None

  Comment: last intercourse 19

Lifestyle

 Physical activity:

  Days per week: Not on file

  Minutes per session: Not on file

 Stress: Not on file

Relationships

 Social connections:

  Talks on phone: Not on file

  Gets together: Not on file

  Attends Synagogue service: Not on file

  Active member of club or organization: Not on file

  Attends meetings of clubs or organizations: Not on file

  Relationship status: Not on file

 Intimate partner violence:

  Fear of current or ex partner: Not on file

  Emotionally abused: Not on file

  Physically abused: Not on file

  Forced sexual activity: Not on file

Other Topics Concern

 Not on file

Social History Narrative

 Not on file



Social History



Substance and Sexual Activity

Sexual Activity Yes

 Partners: Male

 Birth control/protection: None

 Comment: last intercourse 19







Labs

Labs are pending.



Radiology

No new radiology.



Allergies

Mendy is allergic to tamiflu [oseltamivir phosphate].



Medications

Mendy has a current medication list which includes the following prescription(
s): prenatal vit 33-iron-folic-dha.



Review of Systems

Eyes: Negative for visual disturbance.

Cardiovascular: Negative for leg swelling.

Gastrointestinal: Negative for abdominal pain, nausea and vomiting.

Genitourinary: Negative for vaginal bleeding, vaginal discharge and pelvic pain.

Neurological: Negative for headaches.



/66  | Pulse 88  | Temp 36.6 C (97.8 F)  | Resp 16  | Ht 5' 4" (1.626 
m)  | Wt 208 lb (94.3 kg)  | LMP 2018 (Exact Date)  | BMI 35.70 kg/m

Pregravid BMI: 30.0



Physical Exam



PRENATAL PHYSICAL:

General Exam:

Neurological: Normal

Abdomen: Normal

Extremities: Normal



Pelvic Exam:

Vagina:

    Chaperone present for the exam: HECTOR Galvez student

Cervix:

    Closed/50/-3

Membrane status: Intact

Uterus: 35cm  Weeks





Assessment/Plan

High risk pregnancy, antepartum  (primary encounter diagnosis)

Previous  delivery affecting pregnancy, antepartum

Multiparity

Desires  (vaginal birth after ) trial

Comment: Routine Prenatal Visit

Plan: CBC WITH DIFF, GC &amp; CHLAMYDIA AMPLIFIED ASSAY,

      GROUP B STREPTOCOCCUS BY PCR, CBC WITH

      DIFFERENTIAL, POCT URINALYSIS W SPECIFIC

      GRAVITY

      Denies zika virus risk, signs and symptoms such as fever,rash,joint pain, 
conjunctivitis (red eyes), muscle pain, headaches; outside [place] travel to areas 
affected by zika, and FOB exposure to zika.Educated on use of mosquito 
repellent.



Obesity in pregnancy

Comment: See BMI

Plan: Patient encouraged to limit weight gain and sensible diet.



Return to clinic in 1 weeks.

Discussed treatment options.

Medications as ordered.

Reviewed patient instructions and provided printed copy.



This visit did not involve counseling and coordination that comprised more than 
50% of the visit time.

HECTOR Ulloa  2019  2:56 PM

Electronically signed by Melinda Limon FNP at 2019  3:00 PM 
CDTdocumented in this encounter



Plan of Treatment







 Date  Type  Specialty  Care Team  Description

 

 2019  Routine Prenatal Visit  [facility]  Melinda Limon FNP



  



       [address] [place] [address]



  



       [phone] (Fax)  









 Name  Type  Priority  Associated Diagnoses  Date/Time

 

 CBC WITH DIFF  LAB  Routine  High risk pregnancy,  2019  2:39 PM



       antepartum  CDT

 

 GC & CHLAMYDIA AMPLIFIED  LAB  Routine  High risk pregnancy,  2019  2:39 
PM



 ASSAY      antepartum  CDT

 

 GROUP B STREPTOCOCCUS BY  LAB  Routine  High risk pregnancy,  2019  2:39 
PM



 PCR      antepartum  CDT

 

 CBC WITH DIFFERENTIAL  LAB  Routine  High risk pregnancy,  2019  2:39 PM



       antepartum  CDT









 Health Maintenance  Due Date  Last Done  Comments

 

 MENINGOCOCCAL B VACCINES (1 of 2 -  2006    



 Risk Bexsero 2-dose series)      

 

 VARICELLA VACCINES (1 of 2 - 13+  2009    



 2-dose series)      

 

 HPV VACCINES (1 - Female 3-dose  2011    



 series)      

 

 INFLUENZA VACCINE (#1)  2019    

 

 CHLAMYDIA SCREENING  2020  

 

 PAP SMEAR  2022  

 

 DTaP,Tdap,and Td Vaccines (2 - Td)  2029  

 

 PNEUMOCOCCAL 0-64 YEARS COMBINED  Aged Out    No longer eligible based on



 SERIES      patient's age to complete



       this topic



documented as of this encounter



Procedures







 Procedure Name  Priority  Date/Time  Associated Diagnosis  Comments

 

 POCT URINALYSIS  Routine  2019  2:42 PM  High risk pregnancy,  Results 
for this



     CDT  antepartum  procedure are in



         the results



         section.



documented in this encounter



Results

POCT URINALYSIS W SPECIFIC GRAVITY (2019  2:42 PM CDT)





 Component  Value  Ref Range  Performed At  Pathologist Signature

 

 POCT U SP GRAV  .  1.005 - 1.025 mg/dl    

 

 POCT PH U  .  5 - 8 mg/dl    

 

 POCT U LEUK EST  .  Negative - Negative    

 

 POCT U NIT  .  Negative - Negative    

 

 POCT U PROT  neg  Negative - Negative    

 

 POCT U GLU  neg  Negative - Negative    

 

 POCT U KETONE  .  Negative - Negative    

 

 POCT U UROBILI  .  0.2 - 1 mg/dl    

 

 POCT U BILI  .  Negative - Negative    

 

 POCT U BLD  .  Negative - Negative    

 

 POCT U COLOR        

 

 POCT U APPEAR        









 Specimen

 

 Urine - URINE, CLEAN CATCH



documented in this encounter



Visit Diagnoses







 Diagnosis

 

 High risk pregnancy, antepartum - Primary

 

 Previous  delivery affecting pregnancy, antepartum







 Previous  delivery, antepartum condition or complication

 

 Multiparity

 

 Desires  (vaginal birth after ) trial







 Previous  delivery, unspecified as to episode of care or not applicable

 

 Obesity in pregnancy







 Obesity complicating pregnancy, childbirth, or the puerperium, unspecified as 
to



 episode of care or not applicable



documented in this encounter



Insurance







 Payer  Benefit Plan /  Subscriber ID  Effective  Phone  Address  Type



   Group    Dates      

 

 [facility]  xxxxxxxxx  3/1/2019-Prese    [address]  Medicaid



 HEALTH CHOICE -  HEALTH CHOICE    nt    [id number]



  



 MANAGED MEDICAID [place]          [id number]  









 Guarantor Name  Account Type  Relation to  Date of Birth  Phone  Billing



     Patient      Address

 

 Mendy Ho  Personal/Family  Self  1995  [phone] [address]



         (Home)  [address]







           [place]



           [address]



documented as of this encounter



Advance Directives







 Name  Relationship  Healthcare Agent Relationship  Communication

 

 Lauren Vic  Mother  Primary healthcare agent  [phone]



       (Mobile)[phone] (Home)

## 2019-09-20 NOTE — XMS REPORT
Summary of Care

 Created on:2019



Patient:Mendy Ho

Sex:Female

:1995

External Reference #:[id number]





Demographics







 Address  [address]



   [place] [address]

 

 Mobile Phone  [phone]

 

 Home Phone  [phone]

 

 Phone  [phone]

 

 Email Address  [email or web address]

 

 Email Address  [email or web address]

 

 Preferred Language  English

 

 Marital Status  

 

 Buddhist Affiliation  Unknown

 

 Race  White

 

 Ethnic Group   or 









Author







 Organization  [facility]

 

 Address  [address] [place] [address]









Support







 Name  Relationship  Address  Phone

 

 Lauren Ho  Unavailable  [address]  [phone]



     [place] [address]  

 

 La Muñiz  Unavailable  Unknown  [phone]



     [place] [address]  

 

 Dannie Teran  Unavailable  Unavailable  [phone]









Care Team Providers







 Name  Role  Phone

 

 Pcp, Patient Does Not Have A  Unavailable  [phone]

 

 Melinda Limon  Primary Care Provider  [phone]









Reason for Visit







 Reason  Comments

 

 ROUTINE PRENATAL VISIT  







Encounter Details







 Date  Type  Department  Care Team  Description

 

 2019  Routine Prenatal  [facility]-  Melinda Limon  High risk 
pregnancy, antepartum (Primary Dx);



   Visit  HECTOR Walls



  Previous  delivery affecting pregnancy, antepartum;



     [address]



  [address]  Desires  (vaginal birth after ) trial;



     [place], [place]  [address]



  Multiparity;



     [id number]



  [place]  Latimer Ritchie contractions



     [phone]  [id number] [phone]  



       (Fax)  







Allergies







 Active Allergy  Reactions  Severity  Noted Date  Comments

 

 Oseltamivir Phosphate  Rash  Medium  2019  



documented as of this encounter (statuses as of 2019)



Medications







 Medication  Sig  Dispensed  Refills  Start Date  End Date  Status

 

 prenatal vit  Take 1 Packet by  30 Each  6  2019    Suspended



 33-iron-folic-dha  mouth daily.          



 (SELECT-OB + DHA) 29            



 mg iron-1 mg -250 mg            



 combo            



 packIndications: High            



 risk pregnancy,            



 antepartum            

 

 ferrous sulfate 325  Take 1 tablet by  60 tablet  3  2019    Suspended



 mg (65 mg iron)  mouth 2 (two)          



 tabletIndications:  times daily.          



 Anemia of mother in            



 pregnancy, antepartum            

 

 ascorbic acid,  Take 1 tablet by  90 tablet  3  2019    Suspended



 vitamin C, 500 mg  mouth 3 (three)          



 tabletIndications:  times daily.          



 Anemia of mother in            



 pregnancy, antepartum            



documented as of this encounter (statuses as of 2019)



Active Problems







 Problem  Noted Date

 

 Jeramie Ritchie contractions  2019

 

 37 weeks gestation of pregnancy  2019

 

 Labor and delivery indication for care or intervention  2019

 

 Decreased fetal movements in third trimester  2019

 

 Desires  (vaginal birth after ) trial  2019

 

 Abnormal maternal glucose tolerance, antepartum  2019









 Overview: 







 3hr GTT ordered









 Cramps of lower extremity  2019

 

 Maternal varicella, non-immune  2019









 Overview: 







 Address in Postpartum









 High risk pregnancy, antepartum  2019

 

 Previous  delivery affecting pregnancy, antepartum  2019

 

 Multiparity  2019

 

 Back pain, unspecified back location, unspecified back pain laterality,  2019



 unspecified chronicity  









 Pregnant  Estimated Date of Delivery  Comments

 

 Yes  2019  Based on last menstrual period of 2018



     (Exact Date)



documented as of this encounter (statuses as of 2019)



Immunizations







 Name  Administration Dates  Next Due

 

 Tdap  2019  



documented as of this encounter



Social History







 Tobacco Use  Types  Packs/Day  Years Used  Date

 

 Never Smoker        









 Smokeless Tobacco: Never Used      









 Alcohol Use  Drinks/Week  oz/Week  Comments

 

 No      









 Pregnant  Estimated Date of Delivery  Comments

 

 Yes  2019  Based on last menstrual period of 2018



     (Exact Date)









 Sex Assigned at Birth  Date Recorded

 

 Not on file  









 Job Start Date  Occupation  Industry

 

 Not on file  Not on file  Not on file









 Travel History  Travel Start  Travel End









 No recent travel history available.



documented as of this encounter



Last Filed Vital Signs







 Vital Sign  Reading  Time Taken  Comments

 

 Blood Pressure  118/74  2019 11:15 AM CDT  

 

 Pulse  111  2019 11:15 AM CDT  

 

 Temperature  36.4 C (97.6 F)  2019 11:15 AM CDT  

 

 Respiratory Rate  16  2019 11:15 AM CDT  

 

 Oxygen Saturation  -  -  

 

 Inhaled Oxygen Concentration  -  -  

 

 Weight  -  -  

 

 Height  162.6 cm (5' 4")  2019 11:15 AM CDT  

 

 Body Mass Index  -  -  



documented in this encounter



Progress Notes

Melinda Limon, HECTOR - 2019  1:45 PM CDTFormatting of this note might 
be different from the original.

Chief complaint:

Chief Complaint

Patient presents with

 ROUTINE PRENATAL VISIT



HPI CC: Follow Up Prenatal Visit



Mendy Ho is a 24 year old, , /White female. Patient's 
last menstrual period was 2018 (exact date).  She is 37w4d with an 
intrauterine pregnancy.  Her estimated date of delivery is 2019, by Last 
Menstrual Period.  She complains of irregular contractions. She reports +FMand 
denies LOF and bleeding today.Patient denies current or past physical, sexual 
or emotional abuse.



Histories

OB History

 Para Term  AB Living

3 1 1   1 1

SAB TAB Ectopic Multiple Live Births

1       1



# Outcome Date GA Lbr Tom/2nd Weight Sex Delivery Anes PTL Lv

3 Current

2 SAB 18

1 Term 17 39w0d  8 lb 13 oz (3.997 kg) F  SEC   ARNULFO



Past Medical History:

Diagnosis Date

 Abnormal maternal glucose tolerance, antepartum 2019

 Anemia



Family History

Problem Relation Age of Onset

 Depression Mother

 Hypertension Mother

 Hypothyroidism Mother

 Diabetes Father

 High cholesterol Father

 Hypertension Father

 Pancreatic Cancer Maternal Grandmother

 Cancer Maternal Grandmother

 Lung Cancer Maternal Grandfather

 Cancer Maternal Grandfather

 Heart Paternal Grandfather

 Depression Brother



Family Status

Relation Name Status

 Mo  Alive

 Fa  Alive

 MGMo  

 MGFa  

 PGMo  Alive

 PGFa  Alive

 Bro  (Not Specified)



Past Surgical History:

Procedure Laterality Date

  SECTION



Social History



Socioeconomic History

 Marital status: 

  Spouse name: Not on file

 Number of children: Not on file

 Years of education: Not on file

 Highest education level: Not on file

Occupational History

 Not on file

Social Needs

 Financial resource strain: Not on file

 Food insecurity:

  Worry: Not on file

  Inability: Not on file

 Transportation needs:

  Medical: Not on file

  Non-medical: Not on file

Tobacco Use

 Smoking status: Never Smoker

 Smokeless tobacco: Never Used

Substance and Sexual Activity

 Alcohol use: No

 Drug use: No

 Sexual activity: Yes

  Partners: Male

  Birth control/protection: None

  Comment: last intercourse 19

Lifestyle

 Physical activity:

  Days per week: Not on file

  Minutes per session: Not on file

 Stress: Not on file

Relationships

 Social connections:

  Talks on phone: Not on file

  Gets together: Not on file

  Attends Alevism service: Not on file

  Active member of club or organization: Not on file

  Attends meetings of clubs or organizations: Not on file

  Relationship status: Not on file

 Intimate partner violence:

  Fear of current or ex partner: Not on file

  Emotionally abused: Not on file

  Physically abused: Not on file

  Forced sexual activity: Not on file

Other Topics Concern

 Not on file

Social History Narrative

 Not on file



Social History



Substance and Sexual Activity

Sexual Activity Yes

 Partners: Male

 Birth control/protection: None

 Comment: last intercourse 19







Labs

No new labs



Radiology

No new radiology.



Allergies

Mendy is allergic to tamiflu [oseltamivir phosphate].



Medications

Mendy has a current medication list which includes the following prescription(
s): ascorbic acid (vitamin c), ferrous sulfate, and prenatal vit 33-iron-folic-
dha.



Review of Systems

Eyes: Negative for visual disturbance.

Cardiovascular: Negative for leg swelling.

Gastrointestinal: Negative for abdominal pain, nausea and vomiting.

Genitourinary: Negative for vaginal bleeding, vaginal discharge and pelvic pain.

Neurological: Negative for headaches.



/74 (BP Location: Right arm, Patient Position: Sitting, BP CUFF SIZE: 
Adult Small)  | Pulse 111  | Temp 36.4 C (97.6 F) (Oral)  | Resp 16  | Ht 5
' 4" (1.626 m)  | LMP 2018 (Exact Date)| BMI 35.70 kg/m

Pregravid BMI: 30.0



Physical Exam



PRENATAL PHYSICAL:

General Exam:

Neurological: Normal

Abdomen: Normal

Extremities: Normal



Pelvic Exam:

Vagina:

    Chaperone present for the exam: Annabel Elliott RN

Cervix:

    Closed/50/-3

Uterus: 37cm  Weeks





Assessment/Plan

High risk pregnancy, antepartum  (primary encounter diagnosis)

Previous  delivery affecting pregnancy, antepartum

Desires  (vaginal birth after ) trial

Multiparity

Comment:Routine Prenatal Visit

Plan: Denies zika virus risk, signs and symptoms such as fever,rash,joint pain, 
conjunctivitis (red eyes), muscle pain, headaches; outside [place] travel to areas 
affected by zika, and FOB exposure to zika.Educated on use of mosquito 
repellent.



Latimer Ritchie contractions

Comment: patient complaints of irregular

Plan: FETAL NON-STRESS TEST



Return to clinic in 1 weeks.

Discussed treatment options.

Medications as ordered.

Reviewed patient instructions and provided printed copy.



This visit did not involve counseling and coordination that comprised more than 
50% of the visit time.

HECTOR Ulloa  2019  12:24 PM

Electronically signed by Melinda Limon FNP at 2019  2:44 PM 
CDTdocumented in this encounter



Plan of Treatment







 Health Maintenance  Due Date  Last Done  Comments

 

 MENINGOCOCCAL B VACCINES (1 of  2006    



 2 - Risk Bexsero 2-dose      



 series)      

 

 VARICELLA VACCINES (1 of 2 -  2009    



 13+ 2-dose series)      

 

 HPV VACCINES (1 - Female  2011    



 3-dose series)      

 

 INFLUENZA VACCINE (#1)  2019    

 

 CHLAMYDIA SCREENING  2020,  



     2019  

 

 PAP SMEAR  2022  

 

 DTaP,Tdap,and Td Vaccines (2 -  2029  



 Td)      

 

 PNEUMOCOCCAL 0-64 YEARS  Aged Out    No longer eligible based



 COMBINED SERIES      on patient's age to



       complete this topic



documented as of this encounter



Procedures







 Procedure Name  Priority  Date/Time  Associated Diagnosis  Comments

 

 FETAL NON-STRESS  Routine  2019 12:12  Jeramie Ritchie  Results for this



 TEST    PM CDT  contractions  procedure are in



         the results



         section.



documented in this encounter



Results

FETAL NON-STRESS TEST (2019 12:12 PM CDT)





 Specimen

 

 









 Narrative  Performed At

 

 This result has an attachment that is not available.



NST reactive and reassuring.  PACS









 Performing Organization  Address  City/State/Zipcode  Phone Number

 

 PACS      



documented in this encounter



Visit Diagnoses







 Diagnosis

 

 High risk pregnancy, antepartum - Primary

 

 Previous  delivery affecting pregnancy, antepartum







 Previous  delivery, antepartum condition or complication

 

 Desires  (vaginal birth after ) trial







 Previous  delivery, unspecified as to episode of care or not applicable

 

 Multiparity

 

 Latimer Ritchie contractions







 Other threatened labor, unspecified as to episode of care



documented in this encounter



Insurance







 Payer  Benefit Plan /  Subscriber ID  Effective  Phone  Address  Type



   Group    [facility]  xxxxxxxxx  3/1/2019-[place]aran    [address]  Medicaid



 HEALTH CHOICE -  HEALTH [employer]    nt    [id number]



  



 [facility]  [place]          [id number]  









 Guarantor Name  Account Type  Relation to  Date of Birth  Phone  Billing



     Patient      Address

 

 Mendy Ho  Personal/Family  Self  1995  [phone] [address]



         (Home)  [address]







           [place]



           [address]



documented as of this encounter



Advance Directives







 Name  Relationship  Healthcare Agent Relationship  Communication

 

 Lauren Ho  Mother  Primary healthcare agent  [phone]



       (Mobile)[phone] (Home)

## 2019-09-20 NOTE — EDPHYS
Physician Documentation                                                                           

 [facility]                                                                 

Name: Mendy Ho                                                                              

Age: 24 yrs                                                                                       

Sex: Female                                                                                       

: 1995                                                                                   

MRN: [id number]                                                                                   

Arrival Date: 2019                                                                          

Time: 10:05                                                                                       

Account#: [id number]                                                                            

Bed 17                                                                                            

Private MD:                                                                                       

ED Physician Hussein Buckner                                                                       

HPI:                                                                                              

                                                                                             

11:30 This 24 yrs old  Female presents to ER via Wheelchair with complaints of        cp  

      Incision Problem.                                                                           

11:30 The patient presents with drainage from suprapubic surgical incision.                   cp  

11:30 Onset: The symptoms/episode began/occurred today. Associated signs and symptoms:        cp  

      Pertinent positives: fever last night. Patient reports having  delivery 8 days     

      ago \T\UTMB.                                                                                  

                                                                                                  

OB/GYN:                                                                                           

10:27 LMP N/A - Recent birth                                                                  aj1 

                                                                                                  

Historical:                                                                                       

- Allergies:                                                                                      

10:27 Tamiflu;                                                                                aj1 

- PMHx:                                                                                           

10:27 None;                                                                                   aj1 

- PSHx:                                                                                           

10:27 ;                                                                              aj1 

                                                                                                  

- Ebola Screening: : Patient denies travel to an Ebola-affected area in the 21 days               

  before illness onset.                                                                           

                                                                                                  

                                                                                                  

ROS:                                                                                              

11:35 Constitutional: Negative for body aches, chills, fever, poor PO intake.                 cp  

11:35 Eyes: Negative for injury, pain, redness, and discharge.                                cp  

11:35 ENT: Negative for drainage from ear(s), ear pain, sore throat, difficulty swallowing,       

      difficulty handling secretions.                                                             

11:35 Cardiovascular: Negative for chest pain, palpitations.                                      

11:35 Respiratory: Negative for cough, shortness of breath, wheezing.                             

11:35 Abdomen/GI: Negative for nausea, vomiting, and diarrhea, constipation.                      

11:35 Back: Negative for pain at rest, pain with movement.                                        

11:35 Neuro: Negative for altered mental status, headache, weakness.                              

11:35 All other systems are negative.                                                             

                                                                                                  

Exam:                                                                                             

11:45 Constitutional: The patient appears in no acute distress, alert, awake, non-toxic, well cp  

      developed, well nourished.                                                                  

11:45 Head/Face:  Normocephalic, atraumatic.                                                  cp  

11:45 Eyes: Periorbital structures: appear normal, Conjunctiva: normal, no exudate, no            

      injection, Sclera: no appreciated abnormality, Lids and lashes: appear normal,              

      bilaterally.                                                                                

11:45 ENT: External ear(s): are unremarkable, Nose: is normal, Mouth: is normal.                  

11:45 Chest/axilla: Inspection: normal, Palpation: is normal, no crepitus, no tenderness.         

11:45 Cardiovascular: Rate: tachycardic, Rhythm: regular.                                         

11:45 Respiratory: the patient does not display signs of respiratory distress,  Respirations:     

      normal, no use of accessory muscles, no retractions, no splinting, no tachypnea,            

      labored breathing, is not present, Breath sounds: are clear throughout, no decreased        

      breath sounds, no stridor, no wheezing.                                                     

11:45 Abdomen/GI: Suprapubic surgical wound appears w/o dehiscence, minimal swelling noted        

      and mild tenderness to palpation. Drainage from wound appears bloody.                       

11:45 Back: CVA tenderness, is absent.                                                            

11:45 Skin: cellulitis, is not appreciated.                                                       

                                                                                                  

Vital Signs:                                                                                      

10:27  / 75; Pulse 104; Resp 18; Temp 97.5; Pulse Ox 100% on R/A; Weight 90.72 kg (R);  aj1 

      Height 5 ft. 4 in. (162.56 cm) (R); Pain 8/10;                                              

13:40  / 74 Supine; Pulse 73; Resp 16 S; Temp 98.9(O); Pulse Ox 97% on R/A;             aa5 

13:42  / 63 Sitting; Pulse 78;                                                          aa5 

13:44  / 68 Standing; Pulse 102;                                                        aa5 

15:30  / 64; Pulse 80; Resp 18 S; Temp 98.5(O); Pulse Ox 99% on R/A;                    aa5 

16:12  / 65; Pulse 78; Resp 16 S; Temp 98.5(O); Pulse Ox 99% on R/A;                    aa5 

10:27 Body Mass Index 34.33 (90.72 kg, 162.56 cm)                                             aj1 

13:44 Pt c/o feeling lightheaded when standing                                                aa5 

                                                                                                  

MDM:                                                                                              

11:21 Patient medically screened.                                                             cp  

15:18 Data reviewed: vital signs, nurses notes, lab test result(s), radiologic studies,       cp  

      ultrasound, I have discussed the patient's presentation/case with the attending             

      Emergency Department Physician; and as a result, I will discharge patient.                  

15:18 Differential diagnosis: urinary tract infection, abscess, cellulitis, seroma, sepsis.   cp  

      Counseling: I had a detailed discussion with the patient and/or guardian regarding: the     

      historical points, exam findings, and any diagnostic results supporting the                 

      discharge/admit diagnosis, lab results, radiology results, to return to the emergency       

      department if symptoms worsen or persist or if there are any questions or concerns that     

      arise at home. Response to treatment: the patient's symptoms have markedly improved         

      after treatment, and as a result, I will discharge patient. ED course: VSS. Patient         

      with known anemia and is currently taking oral iron. Incision appears to be healing         

      well. Will treat for UTI with oral Keflex and discharge to home for continued               

      monitoring.                                                                                 

                                                                                                  

                                                                                             

13:45 Order name: CBC with Diff; Complete Time: 15:12                                         cp  

                                                                                             

15:11 Interpretation: Normal except: WBC 11.6; HGB 9.3; HCT 28.8; MCV 74.0; MCH 23.8; RDW     cp  

      19.1; MICHAELLE% 79.1; LYM% 13.6; NEUT A 9.2.                                                     

                                                                                             

13:45 Order name: BMP                                                                         cp  

                                                                                             

11:30 Order name: US Abdomen Limited: along  incision; Complete Time: 13:25          cp  

                                                                                             

13:45 Order name: Urine Microscopic Only; Complete Time: 12:44                                cp  

                                                                                             

14:32 Order name: Basic Metabolic Panel; Complete Time: 14:39                                 EDMS

                                                                                             

14:39 Interpretation: Normal except: ; CRE 0.50.                                        cp  

                                                                                             

13:38 Order name: Orthostatics; Complete Time: 13:51                                          cp  

                                                                                             

13:45 Order name: IV; Complete Time: 14:07                                                    cp  

                                                                                             

13:45 Order name: Urine Dipstick-Ancillary (obtain specimen); Complete Time: 15:36            cp  

                                                                                                  

Administered Medications:                                                                         

14:07 Drug: NS 0.9% 1000 ml Route: IV; Rate: 1 bolus; Site: right hand;                       aa5 

15:30 Follow up: IV Status: Completed infusion; IV Intake: 1000ml                             aa5 

16:13 Drug: Rocephin - (cefTRIAXone) 1 grams {Note: administered slow IVP per pharmacy        aa5 

      instruction at this time. .} Route: IVPB; Infused Over: 30 mins; Site: right hand;          

16:15 Follow up: Response: No adverse reaction                                                aa5 

                                                                                                  

                                                                                                  

Disposition:                                                                                      

19 15:19 Discharged to Home. Impression: Anemia complicating childbirth, Intra-abdominal    

  and pelvic swelling, mass and lump, unspecified site.                                           

- Condition is Stable.                                                                            

- Discharge Instructions: Anemia, Nonspecific.                                                    

- Prescriptions for Keflex 500 mg Oral Capsule - take 1 capsule by ORAL route every 8             

  hours for 10 days; 30 capsule.                                                                  

- Medication Reconciliation Form, Thank You Letter, Antibiotic Education, Prescription            

  Opioid Use form.                                                                                

- Follow up: Private Physician; When: 2 - 3 days; Reason: Wound Recheck.                          

- Problem is new.                                                                                 

- Symptoms have improved.                                                                         

                                                                                                  

                                                                                                  

                                                                                                  

Addendum:                                                                                         

2019                                                                                        

     08:49 Co-signature as Attending Physician, Hussein Buckner MD I agree with the assessment and   k
dr

           plan of care.                                                                          

                                                                                                  

Signatures:                                                                                       

Dispatcher [employer]                           EDMS                                                 

Delphine Solis RN                     RN   aj1                                                  

Hussein Buckner MD MD   kdr                                                  

Laurel Ta RN                     RN   aa5                                                  

Dick Phipps PA                         PA   cp                                                   

                                                                                                  

Corrections: (The following items were deleted from the chart)                                    

                                                                                             

15:23 15:19 2019 15:19 Discharged to Home. Impression: Anemia complicating childbirth.  cp  

      Condition is Stable. Forms are Medication Reconciliation Form, Thank You Letter,            

      Antibiotic Education, Prescription Opioid Use. Follow up: Private Physician; When: 2 -      

      3 days; Reason: Wound Recheck. Problem is new. Symptoms have improved. cp                   

16:19 15:23 2019 15:19 Discharged to Home. Impression: Anemia complicating childbirth;  aa5 

      Intra-abdominal and pelvic swelling, mass and lump, unspecified site. Condition is          

      Stable. Forms are Medication Reconciliation Form, Thank You Letter, Antibiotic              

      Education, Prescription Opioid Use. Follow up: Private Physician; When: 2 - 3 days;         

      Reason: Wound Recheck. Problem is new. Symptoms have improved. cp                           

                                                                                                  

**************************************************************************************************

## 2019-09-20 NOTE — RAD REPORT
EXAM DESCRIPTION:  US - Abdomen Exam Limited - 9/20/2019 12:28 pm

 

CLINICAL HISTORY:  bleeding from incision

 

COMPARISON:  No comparisons

 

FINDINGS:  Limited abdominal sonography was performed along the midline incision of recently performe
d surgery. Medially deep to the incision is a 3.5 x 1.3 centimeter heterogeneous hypoechoic collectio
n. This is most likely blood products. No thickened or echogenic rim or rind around this collection. 
Abscess is not currently suspected.

 

IMPRESSION:  Approximately 3.5 centimeter size fluid collection deep to the midline incision. This ha
s the appearance of old blood product. No acute hematoma seen. Abscess not suspected.